# Patient Record
Sex: FEMALE | Race: BLACK OR AFRICAN AMERICAN | NOT HISPANIC OR LATINO | Employment: FULL TIME | URBAN - METROPOLITAN AREA
[De-identification: names, ages, dates, MRNs, and addresses within clinical notes are randomized per-mention and may not be internally consistent; named-entity substitution may affect disease eponyms.]

---

## 2019-11-07 ENCOUNTER — OCCUPATIONAL HEALTH (OUTPATIENT)
Dept: URGENT CARE | Facility: CLINIC | Age: 44
End: 2019-11-07

## 2019-11-07 DIAGNOSIS — Z02.83 ENCOUNTER FOR DRUG SCREENING: Primary | ICD-10-CM

## 2019-11-07 PROCEDURE — 80306 OOH DOT DRUG SCREEN: ICD-10-PCS | Mod: S$GLB,,, | Performed by: PREVENTIVE MEDICINE

## 2019-11-07 PROCEDURE — 80306 DRUG TEST PRSMV INSTRMNT: CPT | Mod: S$GLB,,, | Performed by: PREVENTIVE MEDICINE

## 2020-04-13 ENCOUNTER — HOSPITAL ENCOUNTER (EMERGENCY)
Facility: HOSPITAL | Age: 45
Discharge: HOME | End: 2020-04-13
Attending: EMERGENCY MEDICINE
Payer: COMMERCIAL

## 2020-04-13 VITALS
WEIGHT: 140 LBS | HEART RATE: 72 BPM | TEMPERATURE: 99 F | SYSTOLIC BLOOD PRESSURE: 127 MMHG | RESPIRATION RATE: 20 BRPM | OXYGEN SATURATION: 98 % | DIASTOLIC BLOOD PRESSURE: 75 MMHG

## 2020-04-13 DIAGNOSIS — O20.0 THREATENED MISCARRIAGE: Primary | ICD-10-CM

## 2020-04-13 DIAGNOSIS — N30.01 ACUTE CYSTITIS WITH HEMATURIA: ICD-10-CM

## 2020-04-13 LAB
ABO + RH BLD: NORMAL
ALBUMIN SERPL BCP-MCNC: 3.8 G/DL (ref 3.5–5.2)
ALP SERPL-CCNC: 32 U/L (ref 55–135)
ALT SERPL W/O P-5'-P-CCNC: 10 U/L (ref 10–44)
ANION GAP SERPL CALC-SCNC: 10 MMOL/L (ref 8–16)
AST SERPL-CCNC: 17 U/L (ref 10–40)
B-HCG UR QL: POSITIVE
BACTERIA #/AREA URNS HPF: ABNORMAL /HPF
BASOPHILS # BLD AUTO: 0.05 K/UL (ref 0–0.2)
BASOPHILS NFR BLD: 0.7 % (ref 0–1.9)
BILIRUB SERPL-MCNC: 0.3 MG/DL (ref 0.1–1)
BILIRUB UR QL STRIP: NEGATIVE
BUN SERPL-MCNC: 11 MG/DL (ref 6–20)
CALCIUM SERPL-MCNC: 8.7 MG/DL (ref 8.7–10.5)
CHLORIDE SERPL-SCNC: 106 MMOL/L (ref 95–110)
CLARITY UR: CLEAR
CO2 SERPL-SCNC: 22 MMOL/L (ref 23–29)
COLOR UR: ABNORMAL
CREAT SERPL-MCNC: 0.7 MG/DL (ref 0.5–1.4)
CTP QC/QA: YES
DIFFERENTIAL METHOD: ABNORMAL
EOSINOPHIL # BLD AUTO: 0.2 K/UL (ref 0–0.5)
EOSINOPHIL NFR BLD: 2.3 % (ref 0–8)
ERYTHROCYTE [DISTWIDTH] IN BLOOD BY AUTOMATED COUNT: 12.4 % (ref 11.5–14.5)
EST. GFR  (AFRICAN AMERICAN): >60 ML/MIN/1.73 M^2
EST. GFR  (NON AFRICAN AMERICAN): >60 ML/MIN/1.73 M^2
GLUCOSE SERPL-MCNC: 87 MG/DL (ref 70–110)
GLUCOSE UR QL STRIP: NEGATIVE
HCG INTACT+B SERPL-ACNC: 4673 MIU/ML
HCT VFR BLD AUTO: 32.6 % (ref 37–48.5)
HGB BLD-MCNC: 10.6 G/DL (ref 12–16)
HGB UR QL STRIP: ABNORMAL
HYALINE CASTS #/AREA URNS LPF: 0 /LPF
IMM GRANULOCYTES # BLD AUTO: 0.04 K/UL (ref 0–0.04)
IMM GRANULOCYTES NFR BLD AUTO: 0.6 % (ref 0–0.5)
KETONES UR QL STRIP: ABNORMAL
LEUKOCYTE ESTERASE UR QL STRIP: ABNORMAL
LYMPHOCYTES # BLD AUTO: 1.7 K/UL (ref 1–4.8)
LYMPHOCYTES NFR BLD: 24.6 % (ref 18–48)
MCH RBC QN AUTO: 27.4 PG (ref 27–31)
MCHC RBC AUTO-ENTMCNC: 32.5 G/DL (ref 32–36)
MCV RBC AUTO: 84 FL (ref 82–98)
MICROSCOPIC COMMENT: ABNORMAL
MONOCYTES # BLD AUTO: 0.6 K/UL (ref 0.3–1)
MONOCYTES NFR BLD: 8.7 % (ref 4–15)
NEUTROPHILS # BLD AUTO: 4.3 K/UL (ref 1.8–7.7)
NEUTROPHILS NFR BLD: 63.1 % (ref 38–73)
NITRITE UR QL STRIP: POSITIVE
NRBC BLD-RTO: 0 /100 WBC
PH UR STRIP: 7 [PH] (ref 5–8)
PLATELET # BLD AUTO: 284 K/UL (ref 150–350)
PMV BLD AUTO: 10 FL (ref 9.2–12.9)
POTASSIUM SERPL-SCNC: 3.7 MMOL/L (ref 3.5–5.1)
PROT SERPL-MCNC: 6.9 G/DL (ref 6–8.4)
PROT UR QL STRIP: ABNORMAL
RBC # BLD AUTO: 3.87 M/UL (ref 4–5.4)
RBC #/AREA URNS HPF: >100 /HPF (ref 0–4)
SODIUM SERPL-SCNC: 138 MMOL/L (ref 136–145)
SP GR UR STRIP: 1.01 (ref 1–1.03)
URN SPEC COLLECT METH UR: ABNORMAL
UROBILINOGEN UR STRIP-ACNC: 1 EU/DL
WBC # BLD AUTO: 6.87 K/UL (ref 3.9–12.7)
WBC #/AREA URNS HPF: 11 /HPF (ref 0–5)

## 2020-04-13 PROCEDURE — 25000003 PHARM REV CODE 250: Performed by: PHYSICIAN ASSISTANT

## 2020-04-13 PROCEDURE — 81000 URINALYSIS NONAUTO W/SCOPE: CPT

## 2020-04-13 PROCEDURE — 86901 BLOOD TYPING SEROLOGIC RH(D): CPT

## 2020-04-13 PROCEDURE — 88305 TISSUE EXAM BY PATHOLOGIST: ICD-10-PCS | Mod: 26,,, | Performed by: PATHOLOGY

## 2020-04-13 PROCEDURE — 99284 EMERGENCY DEPT VISIT MOD MDM: CPT | Mod: 25

## 2020-04-13 PROCEDURE — 85025 COMPLETE CBC W/AUTO DIFF WBC: CPT

## 2020-04-13 PROCEDURE — 88305 TISSUE EXAM BY PATHOLOGIST: CPT | Performed by: PATHOLOGY

## 2020-04-13 PROCEDURE — 96360 HYDRATION IV INFUSION INIT: CPT

## 2020-04-13 PROCEDURE — 88305 TISSUE EXAM BY PATHOLOGIST: CPT | Mod: 26,,, | Performed by: PATHOLOGY

## 2020-04-13 PROCEDURE — 80053 COMPREHEN METABOLIC PANEL: CPT

## 2020-04-13 PROCEDURE — 84702 CHORIONIC GONADOTROPIN TEST: CPT

## 2020-04-13 PROCEDURE — 96361 HYDRATE IV INFUSION ADD-ON: CPT

## 2020-04-13 PROCEDURE — 81025 URINE PREGNANCY TEST: CPT | Performed by: PHYSICIAN ASSISTANT

## 2020-04-13 PROCEDURE — 87086 URINE CULTURE/COLONY COUNT: CPT

## 2020-04-13 RX ORDER — CEPHALEXIN 500 MG/1
500 CAPSULE ORAL EVERY 12 HOURS
Qty: 14 CAPSULE | Refills: 0 | Status: SHIPPED | OUTPATIENT
Start: 2020-04-13 | End: 2020-04-20

## 2020-04-13 RX ADMIN — SODIUM CHLORIDE 1000 ML: 0.9 INJECTION, SOLUTION INTRAVENOUS at 03:04

## 2020-04-13 NOTE — ED NOTES
PRESENT TO ED DUE TO 10 WEEKS PREGNANT AND WAS SPOTTING FOR 2 DAYS, THEN TODAY STARTED HEAVY BLEEDING WITH CLOTTING. PAIN TO RLQ.  APPEARANCE: Alert, oriented and in no acute distress.  CARDIAC: Normal rate and rhythm, no murmur heard.   PERIPHERAL VASCULAR: peripheral pulses present. Normal cap refill. No edema. Warm to touch.    RESPIRATORY:Normal rate and effort, breath sounds clear bilaterally throughout chest. Respirations are equal and unlabored no obvious signs of distress.  GASTRO: soft, bowel sounds normal,tenderness, no abdominal distention.  MUSC: Full ROM. No bony tenderness or soft tissue tenderness. No obvious deformity.  SKIN: Skin is warm and dry, normal skin turgor, mucous membranes moist.  NEURO: 5/5 strength major flexors/extensors bilaterally. Sensory intact to light touch bilaterally. Wilder coma scale: eyes open spontaneously-4, oriented & converses-5, obeys commands-6. No neurological abnormalities.   MENTAL STATUS: awake, alert and aware of environment.  EYE: PERRL, both eyes: pupils brisk and reactive to light. Normal size.  ENT: EARS: no obvious drainage. NOSE: no active bleeding.

## 2020-04-13 NOTE — ED PROVIDER NOTES
Encounter Date: 2020    SCRIBE #1 NOTE: I, Francesco Pugh, alexa scribing for, and in the presence of, ADAN Rose.       History     Chief Complaint   Patient presents with    Vaginal Bleeding     pt states vaginal bleeding, with clots that began this am, pt is 10 weeks pregnant, G3      Herminia Melo, a 44 y.o. female   has no past medical history on file.     She presents to the ED evaluation of vaginal bleeding that started to days ago as spotting and has increased in flow with passage of clots.  Patient states that she has not been seen for this pregnancy and has pending appointment with her OB/GYN.  Denies fevers, vomiting, nausea.        The history is provided by the patient.     Review of patient's allergies indicates:  No Known Allergies  No past medical history on file.  No past surgical history on file.  No family history on file.  Social History     Tobacco Use    Smoking status: Not on file   Substance Use Topics    Alcohol use: Not on file    Drug use: Not on file     Review of Systems   Constitutional: Negative for fever.   Gastrointestinal: Negative for abdominal pain, nausea and vomiting.   Genitourinary: Positive for vaginal bleeding. Negative for vaginal discharge and vaginal pain.   All other systems reviewed and are negative.      Physical Exam     Initial Vitals   BP Pulse Resp Temp SpO2   20 1542 20 1541 20 1541 20 1541 20 1541   (!) 150/64 80 17 98.3 °F (36.8 °C) 99 %      MAP       --                Physical Exam    Nursing note and vitals reviewed.  Constitutional: She appears well-developed and well-nourished. She is not diaphoretic. No distress.   HENT:   Head: Normocephalic and atraumatic.   Right Ear: External ear normal.   Left Ear: External ear normal.   Nose: Nose normal.   Eyes: Conjunctivae and EOM are normal.   Neck: Normal range of motion.   Cardiovascular: Normal rate and regular rhythm.   Pulmonary/Chest: Breath sounds  normal. No respiratory distress. She has no wheezes. She has no rhonchi. She has no rales. She exhibits no tenderness.   Abdominal: Soft. Bowel sounds are normal. She exhibits no distension. There is tenderness in the right lower quadrant and suprapubic area. There is no rebound and no guarding.   Genitourinary: Pelvic exam was performed with patient supine. There is bleeding in the vagina.   Genitourinary Comments: Clots noted in vault which were removed in states and specimen cup.  Os open.  Chaperoned by Alexandria Terrell RN    Musculoskeletal: Normal range of motion.   Neurological: She is alert and oriented to person, place, and time.   Skin: Skin is warm and dry. Capillary refill takes less than 2 seconds. No rash noted. No erythema.   Psychiatric: She has a normal mood and affect. Thought content normal.         ED Course   Procedures  Labs Reviewed   CBC W/ AUTO DIFFERENTIAL - Abnormal; Notable for the following components:       Result Value    RBC 3.87 (*)     Hemoglobin 10.6 (*)     Hematocrit 32.6 (*)     Immature Granulocytes 0.6 (*)     All other components within normal limits   COMPREHENSIVE METABOLIC PANEL - Abnormal; Notable for the following components:    CO2 22 (*)     Alkaline Phosphatase 32 (*)     All other components within normal limits   URINALYSIS, REFLEX TO URINE CULTURE - Abnormal; Notable for the following components:    Color, UA Red (*)     Protein, UA 3+ (*)     Ketones, UA 1+ (*)     Occult Blood UA 3+ (*)     Nitrite, UA Positive (*)     Leukocytes, UA 1+ (*)     All other components within normal limits    Narrative:     Preferred Collection Type->Urine, Clean Catch   URINALYSIS MICROSCOPIC - Abnormal; Notable for the following components:    RBC, UA >100 (*)     WBC, UA 11 (*)     Bacteria Few (*)     All other components within normal limits    Narrative:     Preferred Collection Type->Urine, Clean Catch   POCT URINE PREGNANCY - Abnormal; Notable for the following components:     POC Preg Test, Ur Positive (*)     All other components within normal limits   CULTURE, URINE   HCG, QUANTITATIVE, PREGNANCY   GROUP & RH          Imaging Results          US OB <14 Wks TransAbd & TransVag, Single Gestation (XPD) (Final result)  Result time 20 17:14:01    Final result by Marci Aguila MD (20 17:14:01)                 Impression:      No intrauterine pregnancy or gestational sac visualized, technically pregnancy of unknown location.  Findings may relate to early pregnancy or spontaneous .  No adnexal abnormalities or significant free fluid seen to suggest ectopic pregnancy.  Recommend serial beta hCGs and repeat pelvic ultrasound as clinically warranted.      Electronically signed by: Marci Aguila MD  Date:    2020  Time:    17:14             Narrative:    EXAMINATION:  US OB <14 WEEKS, TRANSABDOM & TRANSVAG, SINGLE GESTATION (XPD)    CLINICAL HISTORY:  Vag Bleeding;    TECHNIQUE:  Transabdominal sonography of the pelvis was performed, followed by transvaginal sonography to better evaluate the uterus and ovaries.    COMPARISON:  None.    FINDINGS:  The uterus is retroverted and measures 8.0 x 5.0 x 5.3 cm.  Uterine parenchyma is heterogenous in echotexture.  No intrauterine pregnancy or gestational sac seen.  Endometrium is mildly thickened and hypervascular in appearance.  Nabothian cysts are noted.    The right ovary measures 3.1 x 2.0 x 2.0 cm. The left ovary measures 3.2 x 1.6 x 1.4 cm. Arterial and venous flow are preserved bilaterally. A suspected corpus luteum cyst measuring 1.8 x 1.5 x 1.8 cm is seen in the right ovary.  No adnexal abnormalities are seen.  Small volume physiologic free fluid is seen.                                 Medical Decision Making:   Initial Assessment:   Vaginal bleeding in pregnancy  Differential Diagnosis:   Miscarriage, subchorionic hemorrhage, UTI  Clinical Tests:   Lab Tests: Ordered and Reviewed  The following lab test(s) were  unremarkable: CBC, CMP, Urinalysis and B-HCG  ED Management:  Patient presents to the ER for evaluation vaginal bleeding in early pregnancy.  Mild tenderness to palpation in right lower quadrant as well as suprapubic region.  UA concerning for mild dehydration as well as UTI.  No other concerning findings on lab work.  Ultrasound shows no evidence of IUP.  No free fluid indicated scopic.  Patient was given expectant management for probable miscarriage and was instructed to follow up with her OB for cereal beta HCG testing and further instruction.  Instructed on pelvic rest as well.  Patient was given explicit reasons for return she verbalized understanding and agreement with plan.                                 Clinical Impression:       ICD-10-CM ICD-9-CM   1. Threatened miscarriage O20.0 640.00   2. Acute cystitis with hematuria N30.01 595.0                   I, Ling Avina PA-C, personally performed the services described in this documentation. All medical record entries made by the scribe were at my direction and in my presence.  I have reviewed the chart and agree that the record reflects my personal performance and is accurate and complete.                 Ling Avina PA-C  04/13/20 1825

## 2020-04-13 NOTE — ED NOTES
Pt presents to the ED from home with c/o vaginal bleeding in pregnancy confirmed with home pregnancy test. Pt reports LMP in February, has not seen OB. Pt is , reports spotting that started 2 days ago with increased bleeding and large blood clots today. Pt also c/o lower abd/pelvic cramping. Pt denies any dizziness, sob, cp. No other complaints presented at this time.

## 2020-04-14 ENCOUNTER — TELEPHONE (OUTPATIENT)
Dept: OBSTETRICS AND GYNECOLOGY | Facility: CLINIC | Age: 45
End: 2020-04-14

## 2020-04-14 ENCOUNTER — PES CALL (OUTPATIENT)
Dept: ADMINISTRATIVE | Facility: CLINIC | Age: 45
End: 2020-04-14

## 2020-04-14 DIAGNOSIS — O03.4 INCOMPLETE ABORTION: Primary | ICD-10-CM

## 2020-04-14 NOTE — TELEPHONE ENCOUNTER
Patient states she was having heavy bleeding (saturating an over night pad every hour) with a lot of large blood clots and pain.  Patient denies having heavy bleeding currently and pain comes and goes.  Patient advised on ectopic and bleeding precautions and also DVT precautions.  Repeat CG scheduled tomorrow at 4:30 and patient will come in to see Dr. Valdivia on Friday @ 8:30

## 2020-04-14 NOTE — TELEPHONE ENCOUNTER
----- Message from Analilia Redman sent at 4/14/2020  8:39 AM CDT -----  Contact: [phone]/self  Patient is requesting a call back. She went to the ER last night and had a miscarriage. She needs a follow up appt. Please call her. Thanks

## 2020-04-14 NOTE — TELEPHONE ENCOUNTER
Notes reviewed but please verify that patient did have heavy bleeding. Also, did the patient ever have an US prior to ED at another provider to verify pregnancy? Send for BHCG late Wednesday, give ectopic and bleeding precautions and will see her on Friday. If gets leg calf pain and SOB return to ED

## 2020-04-14 NOTE — TELEPHONE ENCOUNTER
Patient was scheduled to see Dr. Valdivia on 5/20/20 to confirm her pregnancy.  Patient went to the ED yesterday afternoon for vaginal bleeding and pain.  Patient's HCG was 4673.  Ultrasound did not identify an IUP.  Patient states her LMP was 2/2/20, she would be 10w2d by LMP.  Patient is currently complaining of bilateral lower quadrant and low back pain 4/10 but denies menstrual like cramping.  Patient is also complaining of numbness in her right leg that started this morning but denies SOB.  ED is recommending follow up with OB for serial BHCG's.  Notified Dr. Valdivia is out of the office today but will forward the message for further recommendations and if/when needs to be seen.  Patient verbalized understanding.    Please advise.

## 2020-04-15 LAB
BACTERIA UR CULT: NO GROWTH
FINAL PATHOLOGIC DIAGNOSIS: NORMAL
GROSS: NORMAL

## 2020-04-16 ENCOUNTER — LAB VISIT (OUTPATIENT)
Dept: LAB | Facility: HOSPITAL | Age: 45
End: 2020-04-16
Attending: OBSTETRICS & GYNECOLOGY
Payer: COMMERCIAL

## 2020-04-16 DIAGNOSIS — O03.4 INCOMPLETE ABORTION: ICD-10-CM

## 2020-04-16 LAB — HCG INTACT+B SERPL-ACNC: 758 MIU/ML

## 2020-04-16 PROCEDURE — 84702 CHORIONIC GONADOTROPIN TEST: CPT

## 2020-04-16 PROCEDURE — 36415 COLL VENOUS BLD VENIPUNCTURE: CPT

## 2020-04-17 ENCOUNTER — OFFICE VISIT (OUTPATIENT)
Dept: OBSTETRICS AND GYNECOLOGY | Facility: CLINIC | Age: 45
End: 2020-04-17
Payer: COMMERCIAL

## 2020-04-17 VITALS
WEIGHT: 146.69 LBS | SYSTOLIC BLOOD PRESSURE: 100 MMHG | HEIGHT: 65 IN | DIASTOLIC BLOOD PRESSURE: 60 MMHG | BODY MASS INDEX: 24.44 KG/M2

## 2020-04-17 DIAGNOSIS — O03.4 INCOMPLETE ABORTION: Primary | ICD-10-CM

## 2020-04-17 PROCEDURE — 99203 PR OFFICE/OUTPT VISIT, NEW, LEVL III, 30-44 MIN: ICD-10-PCS | Mod: S$GLB,,, | Performed by: OBSTETRICS & GYNECOLOGY

## 2020-04-17 PROCEDURE — 99999 PR PBB SHADOW E&M-EST. PATIENT-LVL III: ICD-10-PCS | Mod: PBBFAC,,, | Performed by: OBSTETRICS & GYNECOLOGY

## 2020-04-17 PROCEDURE — 99999 PR PBB SHADOW E&M-EST. PATIENT-LVL III: CPT | Mod: PBBFAC,,, | Performed by: OBSTETRICS & GYNECOLOGY

## 2020-04-17 PROCEDURE — 3008F PR BODY MASS INDEX (BMI) DOCUMENTED: ICD-10-PCS | Mod: CPTII,S$GLB,, | Performed by: OBSTETRICS & GYNECOLOGY

## 2020-04-17 PROCEDURE — 3008F BODY MASS INDEX DOCD: CPT | Mod: CPTII,S$GLB,, | Performed by: OBSTETRICS & GYNECOLOGY

## 2020-04-17 PROCEDURE — 99203 OFFICE O/P NEW LOW 30 MIN: CPT | Mod: S$GLB,,, | Performed by: OBSTETRICS & GYNECOLOGY

## 2020-04-17 RX ORDER — MISOPROSTOL 200 UG/1
400 TABLET ORAL EVERY 6 HOURS
Qty: 56 TABLET | Refills: 1 | Status: SHIPPED | OUTPATIENT
Start: 2020-04-17 | End: 2020-09-28

## 2020-04-17 RX ORDER — METRONIDAZOLE 500 MG/1
500 TABLET ORAL EVERY 12 HOURS
Qty: 14 TABLET | Refills: 0 | Status: SHIPPED | OUTPATIENT
Start: 2020-04-17 | End: 2020-04-24

## 2020-04-17 NOTE — PROGRESS NOTES
"OBSTETRIC HISTORY:   OB History        3    Para   2    Term   2            AB        Living   2       SAB        TAB        Ectopic        Multiple        Live Births   2                  COMPREHENSIVE GYN HISTORY:  PAP History: Denies abnormal Paps.  Infection History: Chlamydia. Denies PID.  Benign History: Denies uterine fibroids. Denies ovarian cysts. Denies endometriosis.   Cancer History: Denies cervical cancer. Denies uterine cancer or hyperplasia. Denies ovarian cancer. Denies vulvar cancer or pre-cancer. Denies vaginal cancer or pre-cancer. Denies breast cancer. Denies colon cancer.  Sexual Activity History:   reports that she currently engages in sexual activity and has had partner(s) who are Male.   Menstrual History: Every 30 days, flows for 2 days. Light flow.  Dysmenorrhea History: Reports no dysmenorrhea.  Contraception History: None    HPI:   44 y.o.  Patient's last menstrual period was 2020.   Patient is  here for follow up of ER visit with suspected miscarriage. Her BHCG dropped appropriately. She has left sided pain and low back pain. Diagnosed with UTI at ED. She was trying to get pregnant.  She denies bladder, breast and bowel complaints.    ROS:  GENERAL: Denies weight gain or weight loss. Feeling well overall.   SKIN: Denies rash or lesions.   HEAD: Denies headache.   NODES: Denies enlarged lymph nodes.   CHEST: Denies shortness of breath.   ABDOMEN: No abdominal pain, constipation, diarrhea, nausea, vomiting or rectal bleeding.   URINARY: No frequency, dysuria, hematuria, or burning on urination.  REPRODUCTIVE: See HPI.   BREASTS: The patient denies pain, lumps, or nipple discharge.   HEMATOLOGIC: No easy bruisability.   MUSCULOSKELETAL: Denies joint pain or back pain.   NEUROLOGIC: Denies weakness.   PSYCHIATRIC: Denies depression, anxiety or mood swings.    PE:   /60 (BP Location: Right arm, Patient Position: Sitting)   Ht 5' 5" (1.651 m)   Wt 66.5 kg (146 lb " 11.2 oz)   LMP 2020   BMI 24.41 kg/m²   APPEARANCE: Well nourished, well developed, in no acute distress.  ABDOMEN: Soft. No tenderness or masses. No hernias.  PELVIC:   VULVA: No lesions. Normal female genitalia.  URETHRAL MEATUS: Normal size and location, no lesions, no prolapse.  URETHRA: No masses, tenderness, prolapse or scarring.  VAGINA: Moist and well rugated, bloody discharge, no significant cystocele or rectocele.  CERVIX: No lesions and discharge.  UTERUS: Normal size, regular shape, mobile, tender, bladder base nontender.  ADNEXA: No masses but lower pelvic tenderness.    ASSESSMENT:  Incomplete --discussed options of expectant management vs Cytotec vs D&C--wants Cytotec  BHCG   Out of work until   Bleeding/fever precautions  Flagyl to cover for BV    PLAN:  RTO for lab  When she gets pregnant again will send for labs  No sex until negative BHCG

## 2020-04-24 ENCOUNTER — TELEPHONE (OUTPATIENT)
Dept: OBSTETRICS AND GYNECOLOGY | Facility: CLINIC | Age: 45
End: 2020-04-24

## 2020-04-24 ENCOUNTER — LAB VISIT (OUTPATIENT)
Dept: LAB | Facility: HOSPITAL | Age: 45
End: 2020-04-24
Attending: OBSTETRICS & GYNECOLOGY
Payer: COMMERCIAL

## 2020-04-24 DIAGNOSIS — O03.4 INCOMPLETE ABORTION: Primary | ICD-10-CM

## 2020-04-24 DIAGNOSIS — O03.4 INCOMPLETE ABORTION: ICD-10-CM

## 2020-04-24 LAB — HCG INTACT+B SERPL-ACNC: 77 MIU/ML

## 2020-04-24 PROCEDURE — 36415 COLL VENOUS BLD VENIPUNCTURE: CPT

## 2020-04-24 PROCEDURE — 84702 CHORIONIC GONADOTROPIN TEST: CPT

## 2020-04-24 NOTE — TELEPHONE ENCOUNTER
----- Message from Hyun Maurice sent at 4/24/2020  8:17 AM CDT -----  Contact: Herminia Hope called requesting a call back in regards to her previous appt today and the upcoming appt. Please call [phone] to discuss.

## 2020-04-24 NOTE — TELEPHONE ENCOUNTER
Patient states she did her lab work this morning, but had an appointment scheduled for may 20 th should she still keep that appointment.

## 2020-04-24 NOTE — TELEPHONE ENCOUNTER
Notify patient that she just needs a repeat hormone of pregnancy in 2 weeks   This morning it is still positive at 77 and needs to be negative  Orders signed please schedule her for lab in approx 2 weeks

## 2020-06-04 ENCOUNTER — LAB VISIT (OUTPATIENT)
Dept: LAB | Facility: HOSPITAL | Age: 45
End: 2020-06-04
Attending: OBSTETRICS & GYNECOLOGY
Payer: COMMERCIAL

## 2020-06-04 ENCOUNTER — TELEPHONE (OUTPATIENT)
Dept: OBSTETRICS AND GYNECOLOGY | Facility: CLINIC | Age: 45
End: 2020-06-04

## 2020-06-04 DIAGNOSIS — O03.4 INCOMPLETE ABORTION: ICD-10-CM

## 2020-06-04 LAB — HCG INTACT+B SERPL-ACNC: <1.2 MIU/ML

## 2020-06-04 PROCEDURE — 36415 COLL VENOUS BLD VENIPUNCTURE: CPT

## 2020-06-04 PROCEDURE — 84702 CHORIONIC GONADOTROPIN TEST: CPT

## 2020-06-04 NOTE — TELEPHONE ENCOUNTER
Notify [facility] is negative. Did she decide about wanting any birth control??

## 2020-06-05 NOTE — TELEPHONE ENCOUNTER
Attempted to contact patient.  No answer and her mailbox is full.  Patient does not have patient portal

## 2020-09-28 ENCOUNTER — ROUTINE PRENATAL (OUTPATIENT)
Dept: OBSTETRICS AND GYNECOLOGY | Facility: CLINIC | Age: 45
End: 2020-09-28
Payer: COMMERCIAL

## 2020-09-28 VITALS
SYSTOLIC BLOOD PRESSURE: 110 MMHG | HEIGHT: 65 IN | WEIGHT: 149.38 LBS | DIASTOLIC BLOOD PRESSURE: 60 MMHG | BODY MASS INDEX: 24.89 KG/M2

## 2020-09-28 DIAGNOSIS — Z01.419 WELL WOMAN EXAM WITH ROUTINE GYNECOLOGICAL EXAM: Primary | ICD-10-CM

## 2020-09-28 DIAGNOSIS — N93.9 ABNORMAL UTERINE BLEEDING (AUB): ICD-10-CM

## 2020-09-28 DIAGNOSIS — Z12.4 ROUTINE CERVICAL SMEAR: ICD-10-CM

## 2020-09-28 DIAGNOSIS — Z12.31 ENCOUNTER FOR SCREENING MAMMOGRAM FOR MALIGNANT NEOPLASM OF BREAST: ICD-10-CM

## 2020-09-28 LAB
B-HCG UR QL: NEGATIVE
CTP QC/QA: YES

## 2020-09-28 PROCEDURE — 99396 PR PREVENTIVE VISIT,EST,40-64: ICD-10-PCS | Mod: S$GLB,,, | Performed by: OBSTETRICS & GYNECOLOGY

## 2020-09-28 PROCEDURE — 3008F BODY MASS INDEX DOCD: CPT | Mod: CPTII,S$GLB,, | Performed by: OBSTETRICS & GYNECOLOGY

## 2020-09-28 PROCEDURE — 99999 PR PBB SHADOW E&M-EST. PATIENT-LVL III: CPT | Mod: PBBFAC,,, | Performed by: OBSTETRICS & GYNECOLOGY

## 2020-09-28 PROCEDURE — 87624 HPV HI-RISK TYP POOLED RSLT: CPT

## 2020-09-28 PROCEDURE — 88175 CYTOPATH C/V AUTO FLUID REDO: CPT

## 2020-09-28 PROCEDURE — 99999 PR PBB SHADOW E&M-EST. PATIENT-LVL III: ICD-10-PCS | Mod: PBBFAC,,, | Performed by: OBSTETRICS & GYNECOLOGY

## 2020-09-28 PROCEDURE — 3008F PR BODY MASS INDEX (BMI) DOCUMENTED: ICD-10-PCS | Mod: CPTII,S$GLB,, | Performed by: OBSTETRICS & GYNECOLOGY

## 2020-09-28 PROCEDURE — 99396 PREV VISIT EST AGE 40-64: CPT | Mod: S$GLB,,, | Performed by: OBSTETRICS & GYNECOLOGY

## 2020-09-28 NOTE — PROGRESS NOTES
"OBSTETRIC HISTORY:   OB History        3    Para   2    Term   2            AB        Living   2       SAB        TAB        Ectopic        Multiple        Live Births   2                COMPREHENSIVE GYN HISTORY:  PAP History: Denies abnormal Paps.  Infection History: Chlamydia. Denies PID.  Benign History: Denies uterine fibroids. Denies ovarian cysts. Denies endometriosis.   Cancer History: Denies cervical cancer. Denies uterine cancer or hyperplasia. Denies ovarian cancer. Denies vulvar cancer or pre-cancer. Denies vaginal cancer or pre-cancer. Denies breast cancer. Denies colon cancer.  Sexual Activity History:   reports that she currently engages in sexual activity and has had partner(s) who are Male.   Menstrual History: Every 30 days, flows for 2 days. Light flow.  Dysmenorrhea History: Reports no dysmenorrhea.  Contraception History: None         HPI:   45 y.o.  Patient's last menstrual period was 2020.   Patient is  here for her annual gynecologic exam.  She had pariod 20 spotting then 9/3/20 had +UPT then negative UPT. Today negative UPT. She wants to get pregnant.  She denies bladder, breast and bowel complaints.    ROS:  GENERAL: Denies weight gain or weight loss. Feeling well overall.   SKIN: Denies rash or lesions.   HEAD: Denies headache.   NODES: Denies enlarged lymph nodes.   CHEST: Denies shortness of breath.   ABDOMEN: No abdominal pain, constipation, diarrhea, nausea, vomiting or rectal bleeding.   URINARY: No frequency, dysuria, hematuria, or burning on urination.  REPRODUCTIVE: See HPI.   BREASTS: The patient denies pain, lumps, or nipple discharge.   HEMATOLOGIC: No easy bruisability.   MUSCULOSKELETAL: Denies joint pain or back pain.   NEUROLOGIC: Denies weakness.   PSYCHIATRIC: Denies depression, anxiety or mood swings.    PE:   /60   Ht 5' 5" (1.651 m)   Wt 67.8 kg (149 lb 5.8 oz)   LMP 2020   Breastfeeding Unknown Comment: urine leuko large Blood  " BMI 24.86 kg/m²   APPEARANCE: Well nourished, well developed, in no acute distress.  NECK: Neck symmetric without  thyromegaly.  NODES: No inguinal, cervical lymph node enlargement.  CHEST: Lungs clear to auscultation.  HEART: Regular rate and rhythm, no murmurs, rubs or gallops.  ABDOMEN: Soft. No tenderness or masses. No hernias.  BREASTS: Symmetrical, no skin changes or visible lesions. No palpable masses, nipple discharge or adenopathy bilaterally.  PELVIC:   VULVA: No lesions. Normal female genitalia.  URETHRAL MEATUS: Normal size and location, no lesions, no prolapse.  URETHRA: No masses, tenderness, prolapse or scarring.  VAGINA: Moist and well rugated, no discharge, no significant cystocele or rectocele.  CERVIX: No lesions and discharge.  UTERUS: Normal size, regular shape, mobile, non-tender, bladder base nontender.  ADNEXA: No masses or tenderness.    PROCEDURES:  Pap smear  HRHPV    Assessment:  Normal Gynecologic Exam  AUB--get MMG then RTO to discuss CCCT    Plan:  Mammogram and Colonoscopy if indicated by current recommendations.  Return to clinic in one year or for any problems or complaints.    Counseling:  Patient was counseled today on A.C.S. Pap guidelines and recommendations for yearly pelvic exams and monthly self breast exams; to see her PCP for other health maintenance. Regular exercise and healthy diet.

## 2020-10-01 ENCOUNTER — PATIENT MESSAGE (OUTPATIENT)
Dept: OBSTETRICS AND GYNECOLOGY | Facility: CLINIC | Age: 45
End: 2020-10-01

## 2020-10-01 LAB
HPV HR 12 DNA SPEC QL NAA+PROBE: POSITIVE
HPV16 AG SPEC QL: NEGATIVE
HPV18 DNA SPEC QL NAA+PROBE: NEGATIVE

## 2020-10-08 ENCOUNTER — TELEPHONE (OUTPATIENT)
Dept: OBSTETRICS AND GYNECOLOGY | Facility: CLINIC | Age: 45
End: 2020-10-08

## 2020-10-08 NOTE — TELEPHONE ENCOUNTER
----- Message from Arline Cardenas sent at 10/8/2020  9:34 AM CDT -----  Contact: SELF [phone]  Patient would like to speak with you about her last test result. Please advise

## 2020-10-12 ENCOUNTER — TELEPHONE (OUTPATIENT)
Dept: OBSTETRICS AND GYNECOLOGY | Facility: CLINIC | Age: 45
End: 2020-10-12

## 2020-10-12 DIAGNOSIS — Z87.59 HISTORY OF POOR PREGNANCY OUTCOME: Primary | ICD-10-CM

## 2020-10-12 NOTE — TELEPHONE ENCOUNTER
----- Message from Analilia Redman sent at 10/12/2020 11:13 AM CDT -----  Regarding: [facility] is reporting the patient has a positive EPT test and declined having a Mammogram. She was concerned about the test and will contact the office to confirm her pregnancy.  Thanks

## 2020-10-12 NOTE — TELEPHONE ENCOUNTER
Patient called and states she took a pregnancy test at home and it was positive. She was in to see us on 09/28/2020 and her pregnancy test was neg .    Please advise me.

## 2020-10-13 ENCOUNTER — LAB VISIT (OUTPATIENT)
Dept: LAB | Facility: HOSPITAL | Age: 45
End: 2020-10-13
Attending: OBSTETRICS & GYNECOLOGY
Payer: COMMERCIAL

## 2020-10-13 DIAGNOSIS — Z87.59 HISTORY OF POOR PREGNANCY OUTCOME: ICD-10-CM

## 2020-10-13 LAB — HCG INTACT+B SERPL-ACNC: NORMAL MIU/ML

## 2020-10-13 PROCEDURE — 84702 CHORIONIC GONADOTROPIN TEST: CPT

## 2020-10-13 PROCEDURE — 36415 COLL VENOUS BLD VENIPUNCTURE: CPT

## 2020-10-13 PROCEDURE — 84144 ASSAY OF PROGESTERONE: CPT

## 2020-10-14 ENCOUNTER — TELEPHONE (OUTPATIENT)
Dept: OBSTETRICS AND GYNECOLOGY | Facility: CLINIC | Age: 45
End: 2020-10-14

## 2020-10-14 LAB — PROGEST SERPL-MCNC: 16.5 NG/ML

## 2020-10-14 RX ORDER — PROGESTERONE 200 MG/1
400 CAPSULE ORAL NIGHTLY
Qty: 60 CAPSULE | Refills: 1 | Status: SHIPPED | OUTPATIENT
Start: 2020-10-14 | End: 2020-11-12

## 2020-10-14 NOTE — TELEPHONE ENCOUNTER
----- Message from Shawn Montoya sent at 10/14/2020 12:25 PM CDT -----  Type:  Needs Medical Advice    Who Called: Herminia  Symptoms (please be specific): pt is requesting a call back from Dr. Valdivia regarding her test results from yesterday   How long has patient had these symptoms:  unknown  Pharmacy name and phone #:  n/a  Would the patient rather a call back or a response via [facility]? Call back  Best Call Back Number: [phone]  Additional Information: none

## 2020-10-14 NOTE — TELEPHONE ENCOUNTER
Notify progesterone is borderline at 16 does she want to do Prometrium? BHCG is about 10,000 but will know more once I get the repeat lab value.

## 2020-10-15 ENCOUNTER — PATIENT MESSAGE (OUTPATIENT)
Dept: OBSTETRICS AND GYNECOLOGY | Facility: CLINIC | Age: 45
End: 2020-10-15

## 2020-10-15 ENCOUNTER — TELEPHONE (OUTPATIENT)
Dept: OBSTETRICS AND GYNECOLOGY | Facility: CLINIC | Age: 45
End: 2020-10-15

## 2020-10-15 ENCOUNTER — LAB VISIT (OUTPATIENT)
Dept: LAB | Facility: HOSPITAL | Age: 45
End: 2020-10-15
Attending: OBSTETRICS & GYNECOLOGY
Payer: COMMERCIAL

## 2020-10-15 DIAGNOSIS — O20.0 THREATENED ABORTION IN EARLY PREGNANCY: Primary | ICD-10-CM

## 2020-10-15 DIAGNOSIS — Z87.59 HISTORY OF POOR PREGNANCY OUTCOME: ICD-10-CM

## 2020-10-15 LAB — HCG INTACT+B SERPL-ACNC: NORMAL MIU/ML

## 2020-10-15 PROCEDURE — 36415 COLL VENOUS BLD VENIPUNCTURE: CPT

## 2020-10-15 PROCEDURE — 84702 CHORIONIC GONADOTROPIN TEST: CPT

## 2020-10-15 NOTE — TELEPHONE ENCOUNTER
Notify [facility] went up appropriately and we need to get her in for an ultrasound (can be in [facility]) in about 2 weeks and see me in about 3-4 weeks. Order not placed yet

## 2020-10-15 NOTE — TELEPHONE ENCOUNTER
Patient notified and verbalized understanding.      Office appointment given for 11/5/20 @ 2:45.  Notified patient once I have the order we will call her back to schedule ultrasound in [facility]. Please sign pended order.

## 2020-10-20 ENCOUNTER — PATIENT MESSAGE (OUTPATIENT)
Dept: OBSTETRICS AND GYNECOLOGY | Facility: CLINIC | Age: 45
End: 2020-10-20

## 2020-10-20 LAB
FINAL PATHOLOGIC DIAGNOSIS: NORMAL
Lab: NORMAL

## 2020-10-29 ENCOUNTER — TELEPHONE (OUTPATIENT)
Dept: OBSTETRICS AND GYNECOLOGY | Facility: CLINIC | Age: 45
End: 2020-10-29

## 2020-10-29 ENCOUNTER — HOSPITAL ENCOUNTER (OUTPATIENT)
Dept: RADIOLOGY | Facility: HOSPITAL | Age: 45
Discharge: HOME | End: 2020-10-29
Attending: OBSTETRICS & GYNECOLOGY
Payer: COMMERCIAL

## 2020-10-29 DIAGNOSIS — Z87.59 HISTORY OF POOR PREGNANCY OUTCOME: Primary | ICD-10-CM

## 2020-10-29 DIAGNOSIS — O20.0 THREATENED ABORTION IN EARLY PREGNANCY: ICD-10-CM

## 2020-10-29 PROCEDURE — 76801 OB US < 14 WKS SINGLE FETUS: CPT | Mod: 26,,, | Performed by: RADIOLOGY

## 2020-10-29 PROCEDURE — 76817 TRANSVAGINAL US OBSTETRIC: CPT | Mod: 26,,, | Performed by: RADIOLOGY

## 2020-10-29 PROCEDURE — 76801 US OB <14 WEEKS, TRANSABDOM & TRANSVAG, SINGLE GESTATION (XPD): ICD-10-PCS | Mod: 26,,, | Performed by: RADIOLOGY

## 2020-10-29 PROCEDURE — 76817 US OB <14 WEEKS, TRANSABDOM & TRANSVAG, SINGLE GESTATION (XPD): ICD-10-PCS | Mod: 26,,, | Performed by: RADIOLOGY

## 2020-10-29 PROCEDURE — 76801 OB US < 14 WKS SINGLE FETUS: CPT | Mod: TC

## 2020-10-29 NOTE — TELEPHONE ENCOUNTER
[facility]  US is recommending a follow up ultrasound in 1-2 weeks secondary to heart beat of the baby that is on the lower end of normal. This may not mean anything but just needs to be followed  Will sign orders once we determine where to do US

## 2020-11-03 NOTE — TELEPHONE ENCOUNTER
Patient notified and verbalized understanding.  Next available here in the office scheduled 11/12/20 @ 2:30.    Patient is scheduled for OB visit this Thursday, 11/5.  Do you want to move appointment here in the office to 11/12 or have ultrasound repeated downstairs between now and the 5th or keep everything as it stands?  Please advise.

## 2020-11-03 NOTE — TELEPHONE ENCOUNTER
Patient notified and verbalized understanding.    Please sign pended order and route back to be linked

## 2020-11-12 ENCOUNTER — PROCEDURE VISIT (OUTPATIENT)
Dept: OBSTETRICS AND GYNECOLOGY | Facility: CLINIC | Age: 45
End: 2020-11-12
Payer: COMMERCIAL

## 2020-11-12 ENCOUNTER — ROUTINE PRENATAL (OUTPATIENT)
Dept: OBSTETRICS AND GYNECOLOGY | Facility: CLINIC | Age: 45
End: 2020-11-12
Payer: COMMERCIAL

## 2020-11-12 VITALS — BODY MASS INDEX: 25.63 KG/M2 | WEIGHT: 154 LBS | DIASTOLIC BLOOD PRESSURE: 68 MMHG | SYSTOLIC BLOOD PRESSURE: 100 MMHG

## 2020-11-12 DIAGNOSIS — Z87.59 HISTORY OF POOR PREGNANCY OUTCOME: ICD-10-CM

## 2020-11-12 DIAGNOSIS — O02.1 MISSED AB: Primary | ICD-10-CM

## 2020-11-12 DIAGNOSIS — Z34.90 EARLY STAGE OF PREGNANCY: ICD-10-CM

## 2020-11-12 PROCEDURE — 99211 OFF/OP EST MAY X REQ PHY/QHP: CPT | Mod: S$GLB,,, | Performed by: OBSTETRICS & GYNECOLOGY

## 2020-11-12 PROCEDURE — 99211 PR OFFICE/OUTPT VISIT, EST, LEVL I: ICD-10-PCS | Mod: S$GLB,,, | Performed by: OBSTETRICS & GYNECOLOGY

## 2020-11-12 PROCEDURE — 99999 PR PBB SHADOW E&M-EST. PATIENT-LVL II: ICD-10-PCS | Mod: PBBFAC,,, | Performed by: OBSTETRICS & GYNECOLOGY

## 2020-11-12 PROCEDURE — 99999 PR PBB SHADOW E&M-EST. PATIENT-LVL II: CPT | Mod: PBBFAC,,, | Performed by: OBSTETRICS & GYNECOLOGY

## 2020-11-12 PROCEDURE — 76801 OB US < 14 WKS SINGLE FETUS: CPT | Mod: S$GLB,,, | Performed by: OBSTETRICS & GYNECOLOGY

## 2020-11-12 PROCEDURE — 76801 PR US, OB <14WKS, TRANSABD, SINGLE GESTATION: ICD-10-PCS | Mod: S$GLB,,, | Performed by: OBSTETRICS & GYNECOLOGY

## 2020-11-12 NOTE — PROGRESS NOTES
OBSTETRIC HISTORY:   OB History        3    Para   2    Term   2            AB   1    Living   2       SAB        TAB        Ectopic        Multiple        Live Births   2                COMPREHENSIVE GYN HISTORY:  PAP History: Denies abnormal Paps.  Infection History: Chlamydia. Denies PID.  Benign History: Denies uterine fibroids. Denies ovarian cysts. Denies endometriosis.   Cancer History: Denies cervical cancer. Denies uterine cancer or hyperplasia. Denies ovarian cancer. Denies vulvar cancer or pre-cancer. Denies vaginal cancer or pre-cancer. Denies breast cancer. Denies colon cancer.  Sexual Activity History:   reports that she currently engages in sexual activity and has had partner(s) who are Male.   Menstrual History: Every 30 days, flows for 2 days. Light flow.  Dysmenorrhea History: Reports no dysmenorrhea.  Contraception History: None       HPI:   45 y.o.  No LMP recorded.   Patient is  here for ultrasound she is having spotting. She had an US 10/29/20 that showed a 7 week fetal pole with +fetal heart beat but was noted to have a low rate. She is here for follow up US and there is no longer a fetal heart beat.    ROS:  GENERAL: Denies weight gain or weight loss. Feeling well overall.   SKIN: Denies rash or lesions.   HEAD: Denies headache.   NODES: Denies enlarged lymph nodes.   CHEST: Denies shortness of breath.   ABDOMEN: No abdominal pain, constipation, diarrhea, nausea, vomiting or rectal bleeding.   URINARY: No frequency, dysuria, hematuria, or burning on urination.  REPRODUCTIVE: See HPI.   BREASTS: The patient denies pain, lumps, or nipple discharge.   HEMATOLOGIC: No easy bruisability.   MUSCULOSKELETAL: Denies joint pain or back pain.   NEUROLOGIC: Denies weakness.   PSYCHIATRIC: Denies depression, anxiety or mood swings.    PE:   /68   Wt 69.9 kg (154 lb)   BMI 25.63 kg/m²   Talk only for 5 minutes about causes of missed  and in her case age is most common.  Needs to stop Prometrium. Patient had recent missed  and did Cytotec. Will discuss options at visit on .    ASSESSMENT:  Missed     PLAN:  RTO  at 1pm

## 2020-11-16 ENCOUNTER — OFFICE VISIT (OUTPATIENT)
Dept: OBSTETRICS AND GYNECOLOGY | Facility: CLINIC | Age: 45
End: 2020-11-16
Payer: COMMERCIAL

## 2020-11-16 VITALS
HEIGHT: 65 IN | SYSTOLIC BLOOD PRESSURE: 98 MMHG | BODY MASS INDEX: 25.47 KG/M2 | WEIGHT: 152.88 LBS | DIASTOLIC BLOOD PRESSURE: 60 MMHG

## 2020-11-16 DIAGNOSIS — O02.1 MISSED AB: Primary | ICD-10-CM

## 2020-11-16 DIAGNOSIS — O03.4 INCOMPLETE ABORTION: ICD-10-CM

## 2020-11-16 PROCEDURE — 99211 PR OFFICE/OUTPT VISIT, EST, LEVL I: ICD-10-PCS | Mod: S$GLB,,, | Performed by: OBSTETRICS & GYNECOLOGY

## 2020-11-16 PROCEDURE — 3008F BODY MASS INDEX DOCD: CPT | Mod: CPTII,S$GLB,, | Performed by: OBSTETRICS & GYNECOLOGY

## 2020-11-16 PROCEDURE — 99999 PR PBB SHADOW E&M-EST. PATIENT-LVL III: ICD-10-PCS | Mod: PBBFAC,,, | Performed by: OBSTETRICS & GYNECOLOGY

## 2020-11-16 PROCEDURE — 3008F PR BODY MASS INDEX (BMI) DOCUMENTED: ICD-10-PCS | Mod: CPTII,S$GLB,, | Performed by: OBSTETRICS & GYNECOLOGY

## 2020-11-16 PROCEDURE — 99999 PR PBB SHADOW E&M-EST. PATIENT-LVL III: CPT | Mod: PBBFAC,,, | Performed by: OBSTETRICS & GYNECOLOGY

## 2020-11-16 PROCEDURE — 1126F PR PAIN SEVERITY QUANTIFIED, NO PAIN PRESENT: ICD-10-PCS | Mod: S$GLB,,, | Performed by: OBSTETRICS & GYNECOLOGY

## 2020-11-16 PROCEDURE — 99211 OFF/OP EST MAY X REQ PHY/QHP: CPT | Mod: S$GLB,,, | Performed by: OBSTETRICS & GYNECOLOGY

## 2020-11-16 PROCEDURE — 1126F AMNT PAIN NOTED NONE PRSNT: CPT | Mod: S$GLB,,, | Performed by: OBSTETRICS & GYNECOLOGY

## 2020-11-16 RX ORDER — MISOPROSTOL 200 UG/1
400 TABLET ORAL EVERY 6 HOURS
Qty: 56 TABLET | Refills: 1 | Status: SHIPPED | OUTPATIENT
Start: 2020-11-16 | End: 2021-07-29

## 2020-11-16 RX ORDER — HYDROCODONE BITARTRATE AND ACETAMINOPHEN 5; 325 MG/1; MG/1
1 TABLET ORAL EVERY 4 HOURS PRN
Qty: 12 TABLET | Refills: 0 | Status: SHIPPED | OUTPATIENT
Start: 2020-11-16 | End: 2021-07-29

## 2020-11-16 NOTE — PROGRESS NOTES
"  OBSTETRIC HISTORY:   OB History        3    Para   2    Term   2            AB   1    Living   2       SAB        TAB        Ectopic        Multiple        Live Births   2                COMPREHENSIVE GYN HISTORY:  PAP History: Denies abnormal Paps.  Infection History: Chlamydia. Denies PID.  Benign History: Denies uterine fibroids. Denies ovarian cysts. Denies endometriosis.   Cancer History: Denies cervical cancer. Denies uterine cancer or hyperplasia. Denies ovarian cancer. Denies vulvar cancer or pre-cancer. Denies vaginal cancer or pre-cancer. Denies breast cancer. Denies colon cancer.  Sexual Activity History:   reports that she currently engages in sexual activity and has had partner(s) who are Male.   Menstrual History: Every 30 days, flows for 2 days. Light flow.  Dysmenorrhea History: Reports no dysmenorrhea.  Contraception History: None         HPI:   45 y.o.  No LMP recorded (lmp unknown).   Patient is  here to discuss options regarding treatment of failed pregnancy. After reviewing consent for suction D&C patient prefers to do Cytotec which is what she did with her last failed pregnancy. She is having some spotting. She has been instructed no sexual activity until negative BHCG.    ROS:  GENERAL: Denies weight gain or weight loss. Feeling well overall.   SKIN: Denies rash or lesions.   HEAD: Denies headache.   NODES: Denies enlarged lymph nodes.   CHEST: Denies shortness of breath.   ABDOMEN: No abdominal pain, constipation, diarrhea, nausea, vomiting or rectal bleeding.   URINARY: No frequency, dysuria, hematuria, or burning on urination.  REPRODUCTIVE: See HPI.   BREASTS: The patient denies pain, lumps, or nipple discharge.   HEMATOLOGIC: No easy bruisability.   MUSCULOSKELETAL: Denies joint pain or back pain.   NEUROLOGIC: Denies weakness.   PSYCHIATRIC: Denies depression, anxiety or mood swings.    PE:   BP 98/60   Ht 5' 5" (1.651 m)   Wt 69.4 kg (152 lb 14.4 oz)   LMP  (LMP " Unknown)   BMI 25.44 kg/m²   APPEARANCE: Well nourished, well developed, in no acute distress.  Talk only x 6 minutes.      ASSESSMENT:  Missed     PLAN:  BHCG to be repeated   Patient instructed on Cytotec  May still end up with D&C

## 2020-11-30 ENCOUNTER — TELEPHONE (OUTPATIENT)
Dept: OBSTETRICS AND GYNECOLOGY | Facility: HOSPITAL | Age: 45
End: 2020-11-30

## 2020-11-30 ENCOUNTER — LAB VISIT (OUTPATIENT)
Dept: LAB | Facility: HOSPITAL | Age: 45
End: 2020-11-30
Attending: OBSTETRICS & GYNECOLOGY
Payer: COMMERCIAL

## 2020-11-30 DIAGNOSIS — O02.1 MISSED AB: ICD-10-CM

## 2020-11-30 DIAGNOSIS — O03.4 INCOMPLETE ABORTION: Primary | ICD-10-CM

## 2020-11-30 LAB — HCG INTACT+B SERPL-ACNC: 16 MIU/ML

## 2020-11-30 PROCEDURE — 84702 CHORIONIC GONADOTROPIN TEST: CPT

## 2020-11-30 PROCEDURE — 36415 COLL VENOUS BLD VENIPUNCTURE: CPT

## 2020-11-30 NOTE — TELEPHONE ENCOUNTER
Notify hormone of pregnancy is down to 16. It needs to be down to less than 5 to be negative. Needs repeat in 1-2 weeks. Orders signed.  I don't think she needs to take more Cytotec at this time.

## 2020-12-14 ENCOUNTER — PATIENT MESSAGE (OUTPATIENT)
Dept: OBSTETRICS AND GYNECOLOGY | Facility: CLINIC | Age: 45
End: 2020-12-14

## 2021-05-04 ENCOUNTER — PATIENT MESSAGE (OUTPATIENT)
Dept: RESEARCH | Facility: HOSPITAL | Age: 46
End: 2021-05-04

## 2021-06-15 ENCOUNTER — TELEPHONE (OUTPATIENT)
Dept: ADMINISTRATIVE | Facility: OTHER | Age: 46
End: 2021-06-15

## 2021-07-29 ENCOUNTER — OFFICE VISIT (OUTPATIENT)
Dept: OBSTETRICS AND GYNECOLOGY | Facility: CLINIC | Age: 46
End: 2021-07-29
Payer: COMMERCIAL

## 2021-07-29 VITALS
SYSTOLIC BLOOD PRESSURE: 114 MMHG | BODY MASS INDEX: 27.18 KG/M2 | HEIGHT: 65 IN | WEIGHT: 163.13 LBS | DIASTOLIC BLOOD PRESSURE: 74 MMHG

## 2021-07-29 DIAGNOSIS — Z34.90 EARLY STAGE OF PREGNANCY: Primary | ICD-10-CM

## 2021-07-29 DIAGNOSIS — O09.299 HISTORY OF SPONTANEOUS ABORTION, CURRENTLY PREGNANT: ICD-10-CM

## 2021-07-29 PROCEDURE — 1160F RVW MEDS BY RX/DR IN RCRD: CPT | Mod: CPTII,S$GLB,, | Performed by: OBSTETRICS & GYNECOLOGY

## 2021-07-29 PROCEDURE — 1159F PR MEDICATION LIST DOCUMENTED IN MEDICAL RECORD: ICD-10-PCS | Mod: CPTII,S$GLB,, | Performed by: OBSTETRICS & GYNECOLOGY

## 2021-07-29 PROCEDURE — 3074F SYST BP LT 130 MM HG: CPT | Mod: CPTII,S$GLB,, | Performed by: OBSTETRICS & GYNECOLOGY

## 2021-07-29 PROCEDURE — 87491 CHLMYD TRACH DNA AMP PROBE: CPT | Performed by: OBSTETRICS & GYNECOLOGY

## 2021-07-29 PROCEDURE — 3078F DIAST BP <80 MM HG: CPT | Mod: CPTII,S$GLB,, | Performed by: OBSTETRICS & GYNECOLOGY

## 2021-07-29 PROCEDURE — 99203 OFFICE O/P NEW LOW 30 MIN: CPT | Mod: S$GLB,,, | Performed by: OBSTETRICS & GYNECOLOGY

## 2021-07-29 PROCEDURE — 1126F AMNT PAIN NOTED NONE PRSNT: CPT | Mod: CPTII,S$GLB,, | Performed by: OBSTETRICS & GYNECOLOGY

## 2021-07-29 PROCEDURE — 1126F PR PAIN SEVERITY QUANTIFIED, NO PAIN PRESENT: ICD-10-PCS | Mod: CPTII,S$GLB,, | Performed by: OBSTETRICS & GYNECOLOGY

## 2021-07-29 PROCEDURE — 99203 PR OFFICE/OUTPT VISIT, NEW, LEVL III, 30-44 MIN: ICD-10-PCS | Mod: S$GLB,,, | Performed by: OBSTETRICS & GYNECOLOGY

## 2021-07-29 PROCEDURE — 3008F PR BODY MASS INDEX (BMI) DOCUMENTED: ICD-10-PCS | Mod: CPTII,S$GLB,, | Performed by: OBSTETRICS & GYNECOLOGY

## 2021-07-29 PROCEDURE — 1160F PR REVIEW ALL MEDS BY PRESCRIBER/CLIN PHARMACIST DOCUMENTED: ICD-10-PCS | Mod: CPTII,S$GLB,, | Performed by: OBSTETRICS & GYNECOLOGY

## 2021-07-29 PROCEDURE — 87591 N.GONORRHOEAE DNA AMP PROB: CPT | Performed by: OBSTETRICS & GYNECOLOGY

## 2021-07-29 PROCEDURE — 99999 PR PBB SHADOW E&M-EST. PATIENT-LVL III: ICD-10-PCS | Mod: PBBFAC,,, | Performed by: OBSTETRICS & GYNECOLOGY

## 2021-07-29 PROCEDURE — 3074F PR MOST RECENT SYSTOLIC BLOOD PRESSURE < 130 MM HG: ICD-10-PCS | Mod: CPTII,S$GLB,, | Performed by: OBSTETRICS & GYNECOLOGY

## 2021-07-29 PROCEDURE — 3008F BODY MASS INDEX DOCD: CPT | Mod: CPTII,S$GLB,, | Performed by: OBSTETRICS & GYNECOLOGY

## 2021-07-29 PROCEDURE — 3078F PR MOST RECENT DIASTOLIC BLOOD PRESSURE < 80 MM HG: ICD-10-PCS | Mod: CPTII,S$GLB,, | Performed by: OBSTETRICS & GYNECOLOGY

## 2021-07-29 PROCEDURE — 99999 PR PBB SHADOW E&M-EST. PATIENT-LVL III: CPT | Mod: PBBFAC,,, | Performed by: OBSTETRICS & GYNECOLOGY

## 2021-07-29 PROCEDURE — 1159F MED LIST DOCD IN RCRD: CPT | Mod: CPTII,S$GLB,, | Performed by: OBSTETRICS & GYNECOLOGY

## 2021-08-03 LAB
C TRACH DNA SPEC QL NAA+PROBE: NOT DETECTED
N GONORRHOEA DNA SPEC QL NAA+PROBE: NOT DETECTED

## 2021-08-20 ENCOUNTER — PATIENT MESSAGE (OUTPATIENT)
Dept: OBSTETRICS AND GYNECOLOGY | Facility: CLINIC | Age: 46
End: 2021-08-20

## 2021-08-26 ENCOUNTER — INITIAL PRENATAL (OUTPATIENT)
Dept: OBSTETRICS AND GYNECOLOGY | Facility: CLINIC | Age: 46
End: 2021-08-26
Payer: COMMERCIAL

## 2021-08-26 VITALS
WEIGHT: 163.13 LBS | SYSTOLIC BLOOD PRESSURE: 120 MMHG | BODY MASS INDEX: 27.15 KG/M2 | DIASTOLIC BLOOD PRESSURE: 70 MMHG

## 2021-08-26 DIAGNOSIS — O09.299 HISTORY OF SPONTANEOUS ABORTION, CURRENTLY PREGNANT: ICD-10-CM

## 2021-08-26 DIAGNOSIS — Z87.59 HISTORY OF POOR PREGNANCY OUTCOME: ICD-10-CM

## 2021-08-26 DIAGNOSIS — O02.1 EMBRYONIC DEMISE: Primary | ICD-10-CM

## 2021-08-26 PROCEDURE — 99999 PR PBB SHADOW E&M-EST. PATIENT-LVL III: CPT | Mod: PBBFAC,,, | Performed by: OBSTETRICS & GYNECOLOGY

## 2021-08-26 PROCEDURE — 76815 PR  US,PREGNANT UTERUS,LIMITED, 1/> FETUSES: ICD-10-PCS | Mod: S$GLB,,, | Performed by: OBSTETRICS & GYNECOLOGY

## 2021-08-26 PROCEDURE — 99999 PR PBB SHADOW E&M-EST. PATIENT-LVL III: ICD-10-PCS | Mod: PBBFAC,,, | Performed by: OBSTETRICS & GYNECOLOGY

## 2021-08-26 PROCEDURE — 76815 OB US LIMITED FETUS(S): CPT | Mod: S$GLB,,, | Performed by: OBSTETRICS & GYNECOLOGY

## 2021-08-26 PROCEDURE — 99213 PR OFFICE/OUTPT VISIT, EST, LEVL III, 20-29 MIN: ICD-10-PCS | Mod: 25,S$GLB,, | Performed by: OBSTETRICS & GYNECOLOGY

## 2021-08-26 PROCEDURE — 99213 OFFICE O/P EST LOW 20 MIN: CPT | Mod: 25,S$GLB,, | Performed by: OBSTETRICS & GYNECOLOGY

## 2021-09-27 ENCOUNTER — PATIENT MESSAGE (OUTPATIENT)
Dept: OBSTETRICS AND GYNECOLOGY | Facility: CLINIC | Age: 46
End: 2021-09-27

## 2021-10-05 ENCOUNTER — OFFICE VISIT (OUTPATIENT)
Dept: OBSTETRICS AND GYNECOLOGY | Facility: CLINIC | Age: 46
End: 2021-10-05
Payer: COMMERCIAL

## 2021-10-05 VITALS
DIASTOLIC BLOOD PRESSURE: 62 MMHG | SYSTOLIC BLOOD PRESSURE: 112 MMHG | BODY MASS INDEX: 27.18 KG/M2 | WEIGHT: 163.13 LBS | HEIGHT: 65 IN

## 2021-10-05 DIAGNOSIS — N96 HISTORY OF RECURRENT SPONTANEOUS ABORTION, NOT CURRENTLY PREGNANT: Primary | ICD-10-CM

## 2021-10-05 PROCEDURE — 99999 PR PBB SHADOW E&M-EST. PATIENT-LVL III: ICD-10-PCS | Mod: PBBFAC,,, | Performed by: OBSTETRICS & GYNECOLOGY

## 2021-10-05 PROCEDURE — 99213 OFFICE O/P EST LOW 20 MIN: CPT | Mod: S$GLB,,, | Performed by: OBSTETRICS & GYNECOLOGY

## 2021-10-05 PROCEDURE — 99213 PR OFFICE/OUTPT VISIT, EST, LEVL III, 20-29 MIN: ICD-10-PCS | Mod: S$GLB,,, | Performed by: OBSTETRICS & GYNECOLOGY

## 2021-10-05 PROCEDURE — 1160F PR REVIEW ALL MEDS BY PRESCRIBER/CLIN PHARMACIST DOCUMENTED: ICD-10-PCS | Mod: CPTII,S$GLB,, | Performed by: OBSTETRICS & GYNECOLOGY

## 2021-10-05 PROCEDURE — 3078F PR MOST RECENT DIASTOLIC BLOOD PRESSURE < 80 MM HG: ICD-10-PCS | Mod: CPTII,S$GLB,, | Performed by: OBSTETRICS & GYNECOLOGY

## 2021-10-05 PROCEDURE — 3008F BODY MASS INDEX DOCD: CPT | Mod: CPTII,S$GLB,, | Performed by: OBSTETRICS & GYNECOLOGY

## 2021-10-05 PROCEDURE — 1159F PR MEDICATION LIST DOCUMENTED IN MEDICAL RECORD: ICD-10-PCS | Mod: CPTII,S$GLB,, | Performed by: OBSTETRICS & GYNECOLOGY

## 2021-10-05 PROCEDURE — 99999 PR PBB SHADOW E&M-EST. PATIENT-LVL III: CPT | Mod: PBBFAC,,, | Performed by: OBSTETRICS & GYNECOLOGY

## 2021-10-05 PROCEDURE — 3074F PR MOST RECENT SYSTOLIC BLOOD PRESSURE < 130 MM HG: ICD-10-PCS | Mod: CPTII,S$GLB,, | Performed by: OBSTETRICS & GYNECOLOGY

## 2021-10-05 PROCEDURE — 3078F DIAST BP <80 MM HG: CPT | Mod: CPTII,S$GLB,, | Performed by: OBSTETRICS & GYNECOLOGY

## 2021-10-05 PROCEDURE — 1160F RVW MEDS BY RX/DR IN RCRD: CPT | Mod: CPTII,S$GLB,, | Performed by: OBSTETRICS & GYNECOLOGY

## 2021-10-05 PROCEDURE — 3074F SYST BP LT 130 MM HG: CPT | Mod: CPTII,S$GLB,, | Performed by: OBSTETRICS & GYNECOLOGY

## 2021-10-05 PROCEDURE — 3008F PR BODY MASS INDEX (BMI) DOCUMENTED: ICD-10-PCS | Mod: CPTII,S$GLB,, | Performed by: OBSTETRICS & GYNECOLOGY

## 2021-10-05 PROCEDURE — 1159F MED LIST DOCD IN RCRD: CPT | Mod: CPTII,S$GLB,, | Performed by: OBSTETRICS & GYNECOLOGY

## 2022-03-04 ENCOUNTER — OFFICE VISIT (OUTPATIENT)
Dept: OBSTETRICS AND GYNECOLOGY | Facility: CLINIC | Age: 47
End: 2022-03-04
Payer: COMMERCIAL

## 2022-03-04 VITALS
WEIGHT: 160.94 LBS | HEIGHT: 65 IN | BODY MASS INDEX: 26.81 KG/M2 | SYSTOLIC BLOOD PRESSURE: 102 MMHG | DIASTOLIC BLOOD PRESSURE: 60 MMHG

## 2022-03-04 DIAGNOSIS — N92.0 MENORRHAGIA WITH REGULAR CYCLE: Primary | ICD-10-CM

## 2022-03-04 DIAGNOSIS — N96 HISTORY OF RECURRENT SPONTANEOUS ABORTION, NOT CURRENTLY PREGNANT: ICD-10-CM

## 2022-03-04 PROCEDURE — 3074F SYST BP LT 130 MM HG: CPT | Mod: CPTII,S$GLB,, | Performed by: OBSTETRICS & GYNECOLOGY

## 2022-03-04 PROCEDURE — 3008F BODY MASS INDEX DOCD: CPT | Mod: CPTII,S$GLB,, | Performed by: OBSTETRICS & GYNECOLOGY

## 2022-03-04 PROCEDURE — 3078F DIAST BP <80 MM HG: CPT | Mod: CPTII,S$GLB,, | Performed by: OBSTETRICS & GYNECOLOGY

## 2022-03-04 PROCEDURE — 3074F PR MOST RECENT SYSTOLIC BLOOD PRESSURE < 130 MM HG: ICD-10-PCS | Mod: CPTII,S$GLB,, | Performed by: OBSTETRICS & GYNECOLOGY

## 2022-03-04 PROCEDURE — 3008F PR BODY MASS INDEX (BMI) DOCUMENTED: ICD-10-PCS | Mod: CPTII,S$GLB,, | Performed by: OBSTETRICS & GYNECOLOGY

## 2022-03-04 PROCEDURE — 99999 PR PBB SHADOW E&M-EST. PATIENT-LVL III: CPT | Mod: PBBFAC,,, | Performed by: OBSTETRICS & GYNECOLOGY

## 2022-03-04 PROCEDURE — 1159F PR MEDICATION LIST DOCUMENTED IN MEDICAL RECORD: ICD-10-PCS | Mod: CPTII,S$GLB,, | Performed by: OBSTETRICS & GYNECOLOGY

## 2022-03-04 PROCEDURE — 3078F PR MOST RECENT DIASTOLIC BLOOD PRESSURE < 80 MM HG: ICD-10-PCS | Mod: CPTII,S$GLB,, | Performed by: OBSTETRICS & GYNECOLOGY

## 2022-03-04 PROCEDURE — 1159F MED LIST DOCD IN RCRD: CPT | Mod: CPTII,S$GLB,, | Performed by: OBSTETRICS & GYNECOLOGY

## 2022-03-04 PROCEDURE — 99213 OFFICE O/P EST LOW 20 MIN: CPT | Mod: S$GLB,,, | Performed by: OBSTETRICS & GYNECOLOGY

## 2022-03-04 PROCEDURE — 99999 PR PBB SHADOW E&M-EST. PATIENT-LVL III: ICD-10-PCS | Mod: PBBFAC,,, | Performed by: OBSTETRICS & GYNECOLOGY

## 2022-03-04 PROCEDURE — 99213 PR OFFICE/OUTPT VISIT, EST, LEVL III, 20-29 MIN: ICD-10-PCS | Mod: S$GLB,,, | Performed by: OBSTETRICS & GYNECOLOGY

## 2022-03-04 RX ORDER — TRANEXAMIC ACID 650 MG/1
1300 TABLET ORAL 3 TIMES DAILY
Qty: 30 TABLET | Refills: 11 | Status: SHIPPED | OUTPATIENT
Start: 2022-03-04 | End: 2022-07-26

## 2022-03-04 NOTE — PROGRESS NOTES
Subjective:       Patient ID: Herminia Melo is a 46 y.o. female.    Chief Complaint:  Menstrual Problem (Pt is concerned with cycles lasting for 5 days and is really heavy for the past 2 months.)      History of Present Illness  HPI  Patient comes in today complaining of heavy menses for the past couple of months.  Very heavy even with larger pads.  No pain.  Just heavy bleeding    History of recurrent spontaneous .  Has not had her work-up      GYN & OB History  Patient's last menstrual period was 2022 (exact date).   Date of Last Pap: 10/20/2020    OB History    Para Term  AB Living   5 2 2   3 2   SAB IAB Ectopic Multiple Live Births   1       2      # Outcome Date GA Lbr Kulwinder/2nd Weight Sex Delivery Anes PTL Lv   5 SAB 2021           4 AB 2020 8w0d    SAB      3 AB 20 10w0d    SAB      2 Term 10/12/10   3.175 kg (7 lb) F Vag-Spont None N LIDA   1 Term 04/15/05   2.722 kg (6 lb) F Vag-Spont EPI N LIDA     Past Medical History:   Diagnosis Date    Cervical high risk HPV (human papillomavirus) test positive 2020    REPEAT CO-TETING IN 1 YEAR       Past Surgical History:   Procedure Laterality Date    COLONOSCOPY      pt unsure when and why       Family History   Problem Relation Age of Onset    Cancer Paternal Grandmother     Diabetes Father     Hypertension Mother     Sickle cell trait Daughter        Social History     Socioeconomic History    Marital status: Single   Tobacco Use    Smoking status: Former Smoker    Smokeless tobacco: Former User   Substance and Sexual Activity    Alcohol use: Yes     Comment: not since     Drug use: Never    Sexual activity: Yes     Partners: Male     Birth control/protection: None   Social History Narrative    Together since 2018    He is a  for the [employer]    She works for [employer].  [employer]       Current Outpatient Medications   Medication Sig Dispense Refill     multivit,calc,mins/iron/folic (ONE-A-DAY WOMENS FORMULA ORAL) Take by mouth.       No current facility-administered medications for this visit.       Review of patient's allergies indicates:  No Known Allergies    Review of Systems  Review of Systems   Constitutional: Negative for activity change, appetite change, chills, fatigue, fever and unexpected weight change.   HENT: Negative for mouth sores.    Respiratory: Negative for cough, shortness of breath and wheezing.    Cardiovascular: Negative for chest pain and palpitations.   Gastrointestinal: Negative for abdominal pain, bloating, blood in stool, constipation, nausea and vomiting.   Endocrine: Negative for diabetes and hot flashes.   Genitourinary: Positive for menorrhagia and menstrual problem. Negative for dysmenorrhea, dyspareunia, dysuria, frequency, hematuria, pelvic pain, urgency, vaginal bleeding, vaginal discharge, vaginal pain, urinary incontinence, postcoital bleeding and vaginal odor.   Musculoskeletal: Negative for back pain and myalgias.   Integumentary:  Negative for rash, breast mass and nipple discharge.   Neurological: Negative for seizures and headaches.   Psychiatric/Behavioral: Negative for depression and sleep disturbance. The patient is not nervous/anxious.    Breast: Negative for mass, mastodynia and nipple discharge          Objective:    Physical Exam:   Constitutional: She appears well-developed and well-nourished. No distress.   BMI of 26.8    HENT:   Head: Normocephalic and atraumatic.    Eyes: EOM are normal.      Pulmonary/Chest: Effort normal. No respiratory distress.            Abdominal: Soft. She exhibits no distension. There is no abdominal tenderness. There is no rebound and no guarding.     Genitourinary:    Vagina and uterus normal.   No  no vaginal discharge in the vagina.    Genitourinary Comments: Vulva without any obvious lesions.  Urethral meatus normal size and location without any lesion.  Urethra is non-tender  without stricture or discharge.  Bladder is non-tender.  Vaginal vault with good support.  Minimal white discharge noted.  No obvious lesion.  Normal rugation.  Cervix is without any cervical motion tenderness.  No obvious lesion.  Uterus is slightly enlarged, retroverted, minimally tender, normal contour.  Adnexa is without any masses or tenderness.  Perineum without obvious lesion.               Musculoskeletal: Normal range of motion.       Neurological: She is alert.    Skin: Skin is warm and dry.    Psychiatric: She has a normal mood and affect.          Assessment:        1. Menorrhagia with regular cycle    2. History of recurrent spontaneous , not currently pregnant              Plan:      I have discussed with the patient regarding her condition.  She is still trying to get pregnant.  Does not want to be on any hormonal methods to control her menorrhagia  Will try tranexamic acid.  Specific instructions given  We discussed bleeding  Will get pelvic ultrasound  CBC at the end of next menses    Discussed work-up for recurrent Abs.  Labs ordered     Back in 2 months

## 2022-03-16 ENCOUNTER — LAB VISIT (OUTPATIENT)
Dept: LAB | Facility: HOSPITAL | Age: 47
End: 2022-03-16
Attending: OBSTETRICS & GYNECOLOGY
Payer: COMMERCIAL

## 2022-03-16 ENCOUNTER — PATIENT MESSAGE (OUTPATIENT)
Dept: OBSTETRICS AND GYNECOLOGY | Facility: CLINIC | Age: 47
End: 2022-03-16
Payer: COMMERCIAL

## 2022-03-16 DIAGNOSIS — N96 HISTORY OF RECURRENT SPONTANEOUS ABORTION, NOT CURRENTLY PREGNANT: ICD-10-CM

## 2022-03-16 DIAGNOSIS — N92.0 MENORRHAGIA WITH REGULAR CYCLE: ICD-10-CM

## 2022-03-16 LAB
BASOPHILS # BLD AUTO: 0.05 K/UL (ref 0–0.2)
BASOPHILS NFR BLD: 1 % (ref 0–1.9)
DIFFERENTIAL METHOD: ABNORMAL
EOSINOPHIL # BLD AUTO: 0.2 K/UL (ref 0–0.5)
EOSINOPHIL NFR BLD: 3.1 % (ref 0–8)
ERYTHROCYTE [DISTWIDTH] IN BLOOD BY AUTOMATED COUNT: 12 % (ref 11.5–14.5)
HCT VFR BLD AUTO: 35.1 % (ref 37–48.5)
HGB BLD-MCNC: 10.6 G/DL (ref 12–16)
IMM GRANULOCYTES # BLD AUTO: 0.02 K/UL (ref 0–0.04)
IMM GRANULOCYTES NFR BLD AUTO: 0.4 % (ref 0–0.5)
LYMPHOCYTES # BLD AUTO: 1.6 K/UL (ref 1–4.8)
LYMPHOCYTES NFR BLD: 29.9 % (ref 18–48)
MCH RBC QN AUTO: 26.2 PG (ref 27–31)
MCHC RBC AUTO-ENTMCNC: 30.2 G/DL (ref 32–36)
MCV RBC AUTO: 87 FL (ref 82–98)
MONOCYTES # BLD AUTO: 0.4 K/UL (ref 0.3–1)
MONOCYTES NFR BLD: 8.5 % (ref 4–15)
NEUTROPHILS # BLD AUTO: 3 K/UL (ref 1.8–7.7)
NEUTROPHILS NFR BLD: 57.1 % (ref 38–73)
NRBC BLD-RTO: 0 /100 WBC
PLATELET # BLD AUTO: 310 K/UL (ref 150–450)
PMV BLD AUTO: 9.7 FL (ref 9.2–12.9)
RBC # BLD AUTO: 4.04 M/UL (ref 4–5.4)
TSH SERPL DL<=0.005 MIU/L-ACNC: 1.43 UIU/ML (ref 0.4–4)
WBC # BLD AUTO: 5.18 K/UL (ref 3.9–12.7)

## 2022-03-16 PROCEDURE — 85025 COMPLETE CBC W/AUTO DIFF WBC: CPT | Performed by: OBSTETRICS & GYNECOLOGY

## 2022-03-16 PROCEDURE — 88230 TISSUE CULTURE LYMPHOCYTE: CPT | Performed by: OBSTETRICS & GYNECOLOGY

## 2022-03-16 PROCEDURE — 84443 ASSAY THYROID STIM HORMONE: CPT | Performed by: OBSTETRICS & GYNECOLOGY

## 2022-03-16 PROCEDURE — 86235 NUCLEAR ANTIGEN ANTIBODY: CPT | Mod: 59 | Performed by: OBSTETRICS & GYNECOLOGY

## 2022-03-16 PROCEDURE — 86039 ANTINUCLEAR ANTIBODIES (ANA): CPT | Performed by: OBSTETRICS & GYNECOLOGY

## 2022-03-16 PROCEDURE — 86147 CARDIOLIPIN ANTIBODY EA IG: CPT | Performed by: OBSTETRICS & GYNECOLOGY

## 2022-03-16 PROCEDURE — 86038 ANTINUCLEAR ANTIBODIES: CPT | Performed by: OBSTETRICS & GYNECOLOGY

## 2022-03-16 PROCEDURE — 85730 THROMBOPLASTIN TIME PARTIAL: CPT | Performed by: OBSTETRICS & GYNECOLOGY

## 2022-03-16 PROCEDURE — 86376 MICROSOMAL ANTIBODY EACH: CPT | Performed by: OBSTETRICS & GYNECOLOGY

## 2022-03-16 PROCEDURE — 84436 ASSAY OF TOTAL THYROXINE: CPT | Performed by: OBSTETRICS & GYNECOLOGY

## 2022-03-16 PROCEDURE — 88262 CHROMOSOME ANALYSIS 15-20: CPT | Performed by: OBSTETRICS & GYNECOLOGY

## 2022-03-17 LAB
T4 SERPL-MCNC: 5.9 UG/DL (ref 4.5–11.5)
THYROPEROXIDASE IGG SERPL-ACNC: <6 IU/ML

## 2022-03-18 LAB
ANA PATTERN 1: NORMAL
ANA SER QL IF: POSITIVE
ANA TITR SER IF: NORMAL {TITER}

## 2022-03-21 LAB
CARDIOLIPIN IGG SER IA-ACNC: <9.4 GPL (ref 0–14.99)
CARDIOLIPIN IGM SER IA-ACNC: 15.6 MPL (ref 0–12.49)

## 2022-03-24 LAB
ANTI SM ANTIBODY: 0.13 RATIO (ref 0–0.99)
ANTI SM/RNP ANTIBODY: 0.14 RATIO (ref 0–0.99)
ANTI-SM INTERPRETATION: NEGATIVE
ANTI-SM/RNP INTERPRETATION: NEGATIVE
ANTI-SSA ANTIBODY: 0.07 RATIO (ref 0–0.99)
ANTI-SSA INTERPRETATION: NEGATIVE
ANTI-SSB ANTIBODY: 0.07 RATIO (ref 0–0.99)
ANTI-SSB INTERPRETATION: NEGATIVE
DSDNA AB SER-ACNC: NORMAL [IU]/ML

## 2022-03-25 LAB
APTT IMM NP PPP: NORMAL SEC (ref 32–48)
APTT P HEP NEUT PPP: NORMAL SEC (ref 32–48)
CONFIRM APTT STACLOT: NORMAL
DRVVT SCREEN TO CONFIRM RATIO: NORMAL RATIO
LA 3 SCREEN W REFLEX-IMP: NORMAL
LA NT DPL PPP QL: NORMAL
MIXING DRVVT: NORMAL SEC (ref 33–44)
PROTHROMBIN TIME: 12.9 SEC (ref 12–15.5)
REPTILASE TIME: NORMAL SEC
SCREEN APTT: 42 SEC (ref 32–48)
SCREEN DRVVT: 39 SEC (ref 33–44)
THROMBIN TIME: NORMAL SEC (ref 14.7–19.5)

## 2022-03-28 LAB
CHROM BANDING METHOD: NORMAL
CHROMOSOME ANALYSIS CONGENITAL ADDITIONAL INFORMATION: NORMAL
CHROMOSOME ANALYSIS CONGENITAL RELEASED BY: NORMAL
CHROMOSOME ANALYSIS CONGENITAL RESULT SUMMARY: NORMAL
CLINICAL CYTOGENETICIST REVIEW: NORMAL
KARYOTYP SPEC: NORMAL
REASON FOR REFERRAL, CHROMOSOME ANALYSIS CONGENITAL: NORMAL
REF LAB TEST METHOD: NORMAL
SPECIMEN SOURCE: NORMAL
SPECIMEN, CHROMOSOME ANALYSIS CONGENITAL: NORMAL

## 2022-04-06 ENCOUNTER — PATIENT MESSAGE (OUTPATIENT)
Dept: OBSTETRICS AND GYNECOLOGY | Facility: CLINIC | Age: 47
End: 2022-04-06
Payer: COMMERCIAL

## 2022-04-11 ENCOUNTER — OFFICE VISIT (OUTPATIENT)
Dept: OBSTETRICS AND GYNECOLOGY | Facility: CLINIC | Age: 47
End: 2022-04-11
Payer: COMMERCIAL

## 2022-04-11 VITALS
WEIGHT: 159.81 LBS | HEIGHT: 65 IN | SYSTOLIC BLOOD PRESSURE: 102 MMHG | DIASTOLIC BLOOD PRESSURE: 62 MMHG | BODY MASS INDEX: 26.63 KG/M2

## 2022-04-11 DIAGNOSIS — N96 HISTORY OF RECURRENT SPONTANEOUS ABORTION, NOT CURRENTLY PREGNANT: Primary | ICD-10-CM

## 2022-04-11 PROCEDURE — 99999 PR PBB SHADOW E&M-EST. PATIENT-LVL III: CPT | Mod: PBBFAC,,, | Performed by: OBSTETRICS & GYNECOLOGY

## 2022-04-11 PROCEDURE — 3078F PR MOST RECENT DIASTOLIC BLOOD PRESSURE < 80 MM HG: ICD-10-PCS | Mod: CPTII,S$GLB,, | Performed by: OBSTETRICS & GYNECOLOGY

## 2022-04-11 PROCEDURE — 3074F SYST BP LT 130 MM HG: CPT | Mod: CPTII,S$GLB,, | Performed by: OBSTETRICS & GYNECOLOGY

## 2022-04-11 PROCEDURE — 99213 PR OFFICE/OUTPT VISIT, EST, LEVL III, 20-29 MIN: ICD-10-PCS | Mod: S$GLB,,, | Performed by: OBSTETRICS & GYNECOLOGY

## 2022-04-11 PROCEDURE — 1159F MED LIST DOCD IN RCRD: CPT | Mod: CPTII,S$GLB,, | Performed by: OBSTETRICS & GYNECOLOGY

## 2022-04-11 PROCEDURE — 99213 OFFICE O/P EST LOW 20 MIN: CPT | Mod: S$GLB,,, | Performed by: OBSTETRICS & GYNECOLOGY

## 2022-04-11 PROCEDURE — 3074F PR MOST RECENT SYSTOLIC BLOOD PRESSURE < 130 MM HG: ICD-10-PCS | Mod: CPTII,S$GLB,, | Performed by: OBSTETRICS & GYNECOLOGY

## 2022-04-11 PROCEDURE — 99999 PR PBB SHADOW E&M-EST. PATIENT-LVL III: ICD-10-PCS | Mod: PBBFAC,,, | Performed by: OBSTETRICS & GYNECOLOGY

## 2022-04-11 PROCEDURE — 3008F BODY MASS INDEX DOCD: CPT | Mod: CPTII,S$GLB,, | Performed by: OBSTETRICS & GYNECOLOGY

## 2022-04-11 PROCEDURE — 1159F PR MEDICATION LIST DOCUMENTED IN MEDICAL RECORD: ICD-10-PCS | Mod: CPTII,S$GLB,, | Performed by: OBSTETRICS & GYNECOLOGY

## 2022-04-11 PROCEDURE — 3078F DIAST BP <80 MM HG: CPT | Mod: CPTII,S$GLB,, | Performed by: OBSTETRICS & GYNECOLOGY

## 2022-04-11 PROCEDURE — 3008F PR BODY MASS INDEX (BMI) DOCUMENTED: ICD-10-PCS | Mod: CPTII,S$GLB,, | Performed by: OBSTETRICS & GYNECOLOGY

## 2022-04-11 NOTE — PROGRESS NOTES
Subjective:       Patient ID: Herminia Melo is a 46 y.o. female.    Chief Complaint:  Follow-up (Follow up lab work)      History of Present Illness  HPI  Patient comes in today for follow-up  History of multiple spontaneous abortions    Pelvic ultrasound on 3/8/22 was essentially normal with hemorrhagic corpus luteum cyst on the right    Blood work was normal except for mild elevation in KATHI at 15 and positive LIGIA    No complaint today      GYN & OB History  Patient's last menstrual period was 2022 (exact date).   Date of Last Pap: 10/20/2020    OB History    Para Term  AB Living   5 2 2   3 2   SAB IAB Ectopic Multiple Live Births   1       2      # Outcome Date GA Lbr Kulwinder/2nd Weight Sex Delivery Anes PTL Lv   5 SAB 2021           4 AB 2020 8w0d    SAB      3 AB 20 10w0d    SAB      2 Term 10/12/10   3.175 kg (7 lb) F Vag-Spont None N LIDA   1 Term 04/15/05   2.722 kg (6 lb) F Vag-Spont EPI N LIDA     Past Medical History:   Diagnosis Date    Cervical high risk HPV (human papillomavirus) test positive 2020    REPEAT CO-TETING IN 1 YEAR       Past Surgical History:   Procedure Laterality Date    COLONOSCOPY      pt unsure when and why       Family History   Problem Relation Age of Onset    Cancer Paternal Grandmother     Diabetes Father     Hypertension Mother     Sickle cell trait Daughter        Social History     Socioeconomic History    Marital status: Single   Tobacco Use    Smoking status: Former Smoker    Smokeless tobacco: Former User   Substance and Sexual Activity    Alcohol use: Yes     Comment: not since     Drug use: Never    Sexual activity: Yes     Partners: Male     Birth control/protection: None   Social History Narrative    Together since 2018    He is a  for the [employer]    She works for [employer].  [employer]       Current Outpatient Medications   Medication Sig Dispense Refill    multivit,calc,mins/iron/folic  (ONE-A-DAY WOMENS FORMULA ORAL) Take by mouth.      tranexamic acid (LYSTEDA) 650 mg tablet Take 2 tablets (1,300 mg total) by mouth 3 (three) times daily. 30 tablet 11     No current facility-administered medications for this visit.       Review of patient's allergies indicates:  No Known Allergies      Review of Systems  Review of Systems   Constitutional: Negative for activity change, appetite change, chills, fatigue, fever and unexpected weight change.   HENT: Negative for mouth sores.    Respiratory: Negative for cough, shortness of breath and wheezing.    Cardiovascular: Negative for chest pain and palpitations.   Gastrointestinal: Negative for abdominal pain, bloating, blood in stool, constipation, nausea and vomiting.   Endocrine: Negative for diabetes and hot flashes.   Genitourinary: Positive for menorrhagia and menstrual problem. Negative for dysmenorrhea, dyspareunia, dysuria, frequency, hematuria, pelvic pain, urgency, vaginal bleeding, vaginal discharge, vaginal pain, urinary incontinence, postcoital bleeding and vaginal odor.   Musculoskeletal: Negative for back pain and myalgias.   Integumentary:  Negative for rash, breast mass and nipple discharge.   Neurological: Negative for seizures and headaches.   Psychiatric/Behavioral: Negative for depression and sleep disturbance. The patient is not nervous/anxious.    Breast: Negative for mass, mastodynia and nipple discharge          Objective:    Physical Exam:   Constitutional: She appears well-developed and well-nourished. No distress.    HENT:   Head: Normocephalic and atraumatic.    Eyes: EOM are normal.     Cardiovascular: Normal rate.     Pulmonary/Chest: Effort normal. No respiratory distress.                  Musculoskeletal: Normal range of motion.       Neurological: She is alert.    Skin: Skin is warm and dry.    Psychiatric: She has a normal mood and affect.          Assessment:        1. History of recurrent spontaneous , not  currently pregnant              Plan:      I have discussed with the patient regarding her condition.  We discussed results.  Nothing significant  She still would like to try to have another child  Will continue with her prenatal vitamins  Consider baby aspirin  Consider work-up for her     Back with a positive UPT

## 2022-06-29 ENCOUNTER — PATIENT MESSAGE (OUTPATIENT)
Dept: OBSTETRICS AND GYNECOLOGY | Facility: CLINIC | Age: 47
End: 2022-06-29
Payer: COMMERCIAL

## 2022-07-26 ENCOUNTER — OFFICE VISIT (OUTPATIENT)
Dept: OBSTETRICS AND GYNECOLOGY | Facility: CLINIC | Age: 47
End: 2022-07-26
Payer: COMMERCIAL

## 2022-07-26 VITALS
BODY MASS INDEX: 28.21 KG/M2 | HEIGHT: 65 IN | SYSTOLIC BLOOD PRESSURE: 130 MMHG | DIASTOLIC BLOOD PRESSURE: 70 MMHG | WEIGHT: 169.31 LBS

## 2022-07-26 DIAGNOSIS — Z32.00 VISIT FOR CONFIRMATION OF PREGNANCY TEST RESULT WITH PHYSICAL EXAM: Primary | ICD-10-CM

## 2022-07-26 DIAGNOSIS — O09.299 HISTORY OF SPONTANEOUS ABORTION, CURRENTLY PREGNANT: ICD-10-CM

## 2022-07-26 DIAGNOSIS — O09.521 MULTIGRAVIDA OF ADVANCED MATERNAL AGE IN FIRST TRIMESTER: ICD-10-CM

## 2022-07-26 DIAGNOSIS — O26.21 HISTORY OF RECURRENT ABORTION, CURRENTLY PREGNANT IN FIRST TRIMESTER: ICD-10-CM

## 2022-07-26 LAB
B-HCG UR QL: POSITIVE
CTP QC/QA: YES

## 2022-07-26 PROCEDURE — 1160F RVW MEDS BY RX/DR IN RCRD: CPT | Mod: CPTII,S$GLB,, | Performed by: OBSTETRICS & GYNECOLOGY

## 2022-07-26 PROCEDURE — 3078F DIAST BP <80 MM HG: CPT | Mod: CPTII,S$GLB,, | Performed by: OBSTETRICS & GYNECOLOGY

## 2022-07-26 PROCEDURE — 76815 PR  US,PREGNANT UTERUS,LIMITED, 1/> FETUSES: ICD-10-PCS | Mod: S$GLB,,, | Performed by: OBSTETRICS & GYNECOLOGY

## 2022-07-26 PROCEDURE — 99999 PR PBB SHADOW E&M-EST. PATIENT-LVL III: ICD-10-PCS | Mod: PBBFAC,,, | Performed by: OBSTETRICS & GYNECOLOGY

## 2022-07-26 PROCEDURE — 3078F PR MOST RECENT DIASTOLIC BLOOD PRESSURE < 80 MM HG: ICD-10-PCS | Mod: CPTII,S$GLB,, | Performed by: OBSTETRICS & GYNECOLOGY

## 2022-07-26 PROCEDURE — 81025 POCT URINE PREGNANCY: ICD-10-PCS | Mod: S$GLB,,, | Performed by: OBSTETRICS & GYNECOLOGY

## 2022-07-26 PROCEDURE — 3075F SYST BP GE 130 - 139MM HG: CPT | Mod: CPTII,S$GLB,, | Performed by: OBSTETRICS & GYNECOLOGY

## 2022-07-26 PROCEDURE — 87591 N.GONORRHOEAE DNA AMP PROB: CPT | Performed by: OBSTETRICS & GYNECOLOGY

## 2022-07-26 PROCEDURE — 99213 PR OFFICE/OUTPT VISIT, EST, LEVL III, 20-29 MIN: ICD-10-PCS | Mod: 25,S$GLB,, | Performed by: OBSTETRICS & GYNECOLOGY

## 2022-07-26 PROCEDURE — 3008F PR BODY MASS INDEX (BMI) DOCUMENTED: ICD-10-PCS | Mod: CPTII,S$GLB,, | Performed by: OBSTETRICS & GYNECOLOGY

## 2022-07-26 PROCEDURE — 1159F MED LIST DOCD IN RCRD: CPT | Mod: CPTII,S$GLB,, | Performed by: OBSTETRICS & GYNECOLOGY

## 2022-07-26 PROCEDURE — 76815 OB US LIMITED FETUS(S): CPT | Mod: S$GLB,,, | Performed by: OBSTETRICS & GYNECOLOGY

## 2022-07-26 PROCEDURE — 81025 URINE PREGNANCY TEST: CPT | Mod: S$GLB,,, | Performed by: OBSTETRICS & GYNECOLOGY

## 2022-07-26 PROCEDURE — 3075F PR MOST RECENT SYSTOLIC BLOOD PRESS GE 130-139MM HG: ICD-10-PCS | Mod: CPTII,S$GLB,, | Performed by: OBSTETRICS & GYNECOLOGY

## 2022-07-26 PROCEDURE — 3008F BODY MASS INDEX DOCD: CPT | Mod: CPTII,S$GLB,, | Performed by: OBSTETRICS & GYNECOLOGY

## 2022-07-26 PROCEDURE — 99999 PR PBB SHADOW E&M-EST. PATIENT-LVL III: CPT | Mod: PBBFAC,,, | Performed by: OBSTETRICS & GYNECOLOGY

## 2022-07-26 PROCEDURE — 1159F PR MEDICATION LIST DOCUMENTED IN MEDICAL RECORD: ICD-10-PCS | Mod: CPTII,S$GLB,, | Performed by: OBSTETRICS & GYNECOLOGY

## 2022-07-26 PROCEDURE — 99213 OFFICE O/P EST LOW 20 MIN: CPT | Mod: 25,S$GLB,, | Performed by: OBSTETRICS & GYNECOLOGY

## 2022-07-26 PROCEDURE — 1160F PR REVIEW ALL MEDS BY PRESCRIBER/CLIN PHARMACIST DOCUMENTED: ICD-10-PCS | Mod: CPTII,S$GLB,, | Performed by: OBSTETRICS & GYNECOLOGY

## 2022-07-26 PROCEDURE — 87491 CHLMYD TRACH DNA AMP PROBE: CPT | Performed by: OBSTETRICS & GYNECOLOGY

## 2022-07-26 RX ORDER — LACTOBACILLUS COMBINATION NO.9 4B CELL
CAPSULE ORAL
COMMUNITY

## 2022-07-26 NOTE — PATIENT INSTRUCTIONS
We discussed management plan for Advanced Maternal Age.  1.  MFM evaluation   2.  Genetic screening, [id number] vs Quad screen  3.  Baby aspirin beginning at around 12 weeks  4.  Ultrasounds at next visit, at 20 weeks, then again at 32 weeks for growth and fluid.  5.  If patient is over 39 yo, she would need NST twice a week with MVP weekly vs NST once a week and BPP weekly.  6.  Deliver by 39-40 weeks.

## 2022-07-26 NOTE — PROGRESS NOTES
Subjective:       Patient ID: Herminia Melo is a 46 y.o. female.    Chief Complaint:  Confirmation (UPT- Positive LMP: 2022 BOYD: 2023 EGA: 8 weeks 3 days.)      History of Present Illness  HPI  Patient has a positive home urine pregnancy test on 2022. She believes she could be pregnant. Pregnancy is desired. Sexual Activity: single partner, contraception: none. Current symptoms also include: breast tenderness, fatigue, frequent urination, nausea, headache and back pain with positive home pregnancy test. Last period was normal.     Patient's last menstrual period was 2023 (exact date).     By date, she should be about 8w3d with EDC on 3/4/2023      GYN & OB History  Patient's last menstrual period was 2022 (exact date).   Date of Last Pap: 10/20/2020    OB History    Para Term  AB Living   6 2 2   3 2   SAB IAB Ectopic Multiple Live Births   1       2      # Outcome Date GA Lbr Kulwinder/2nd Weight Sex Delivery Anes PTL Lv   6 Current            5 SAB 2021           4 AB 2020 8w0d    SAB      3 AB 20 10w0d    SAB      2 Term 10/12/10   3.175 kg (7 lb) F Vag-Spont None N LIDA   1 Term 04/15/05   2.722 kg (6 lb) F Vag-Spont EPI N LIDA     Past Medical History:   Diagnosis Date    Cervical high risk HPV (human papillomavirus) test positive 2020    REPEAT CO-TETING IN 1 YEAR       Past Surgical History:   Procedure Laterality Date    COLONOSCOPY      pt unsure when and why       Family History   Problem Relation Age of Onset    Cancer Paternal Grandmother     Diabetes Father     Hypertension Mother     Sickle cell trait Daughter        Social History     Socioeconomic History    Marital status: Single   Tobacco Use    Smoking status: Former Smoker    Smokeless tobacco: Former User   Substance and Sexual Activity    Alcohol use: Yes     Comment: not since     Drug use: Never    Sexual activity: Yes     Partners: Male     Birth control/protection: None    Social History Narrative    Together since 2018    He is a  for [employer]    She works for [employer].  [employer]       Current Outpatient Medications   Medication Sig Dispense Refill    [id number]-dha-epa-fish oil (PRENATAL GUMMY) 400 mcg-35 mg -25 mg-5 mg Chew Take by mouth.      multivit,calc,mins/iron/folic (ONE-A-DAY WOMENS FORMULA ORAL) Take by mouth.       No current facility-administered medications for this visit.       Review of patient's allergies indicates:  No Known Allergies    Review of Systems  Review of Systems   Constitutional: Positive for fatigue. Negative for activity change, appetite change, fever and unexpected weight change.   Respiratory: Negative for cough, shortness of breath and wheezing.    Cardiovascular: Negative for chest pain and palpitations.   Gastrointestinal: Positive for abdominal pain and nausea. Negative for vomiting.   Endocrine: Negative for hot flashes.   Genitourinary: Positive for frequency, pelvic pain and vaginal discharge. Negative for dysmenorrhea, dyspareunia, urgency, vaginal bleeding and postcoital bleeding.   Musculoskeletal: Positive for back pain. Negative for myalgias.   Integumentary:  Negative for rash, breast mass and nipple discharge.   Neurological: Positive for headaches. Negative for seizures.   Psychiatric/Behavioral: Negative for depression and sleep disturbance. The patient is not nervous/anxious.    Breast: Negative for mass, mastodynia and nipple discharge          Objective:    Physical Exam:   Constitutional: She appears well-developed and well-nourished. No distress.   BMI of     HENT:   Head: Normocephalic and atraumatic.    Eyes: EOM are normal.      Pulmonary/Chest: Effort normal. No respiratory distress.   Breasts: Non-tender, no engorgement, no masses, no retraction, no discharge. Negative for lymphadenopathy.         Abdominal: Soft. She exhibits no distension. There is no abdominal tenderness. There is no  rebound and no guarding.     Genitourinary:    Vagina normal.   No  no vaginal discharge in the vagina.    Genitourinary Comments: Vulva without any obvious lesions.  Urethral meatus normal size and location without any lesion.  Urethra is non-tender without stricture or discharge.  Bladder is non-tender.  Vaginal vault with good support.  Minimal white discharge noted.  No obvious lesion.  Normal rugation.  Cervix is without any cervical motion tenderness.  No obvious lesion.  Uterus is about 8-10 weeks, non-tender, normal contour.  Adnexa is without any masses or tenderness.  Perineum without obvious lesion.               Musculoskeletal: Normal range of motion.       Neurological: She is alert.    Skin: Skin is warm and dry.    Psychiatric: She has a normal mood and affect.        LIMITED FOCUS ULTRASOUND    Date: 2022    Indications:   Ultrasound performed for fetal viability.    Findings:   *A transabdominal ultrasound performed showing normal fetus.  CRL: 2.03 cm   EGA: 8w4d  Placenta: anterior  FHT at 174  EDC:  3/3/23     Plan:   See below       Assessment:        1. Visit for confirmation of pregnancy test result with physical exam    2. Multigravida of advanced maternal age in first trimester    3. History of spontaneous , currently pregnant    4. History of recurrent , currently pregnant in first trimester                Plan:      I have discussed with the patient her condition  She is doing well so far in her early pregnancy  She is taking over-the-counter prenatal vitamins; she will continue  Gonorrhea and chlamydia performed  We will contact her insurance for maternity benefits  [facility] ultrasound requested    We discussed management plan for Advanced Maternal Age.  1.  [facility] evaluation   2.  Genetic screening, [id number] vs Quad screen.  [id number] ordered for next week at 9weeks  3.  Baby aspirin beginning at around 12 weeks  4.  Ultrasounds at next visit, at 20 weeks, then again at  32 weeks for growth and fluid.  5.  Patient is over 41 yo, she would need NST twice a week with MVP weekly vs NST once a week and BPP weekly.  6.  Deliver by 39-40 weeks.    She will be back in about 2-4 weeks for follow-up

## 2022-07-27 NOTE — PROGRESS NOTES
LIMITED FOCUS ULTRASOUND    Date: 7/26/2022    Indications:   Ultrasound performed for fetal viability.    Findings:   *A transabdominal ultrasound performed showing normal fetus.  CRL: 2.03 cm   EGA: 8w4d  Placenta: anterior  FHT at 174  EDC:  3/3/23     Plan:   See below

## 2022-07-30 LAB
C TRACH DNA SPEC QL NAA+PROBE: NOT DETECTED
N GONORRHOEA DNA SPEC QL NAA+PROBE: NOT DETECTED

## 2022-08-01 ENCOUNTER — PATIENT MESSAGE (OUTPATIENT)
Dept: OBSTETRICS AND GYNECOLOGY | Facility: CLINIC | Age: 47
End: 2022-08-01
Payer: COMMERCIAL

## 2022-08-01 PROBLEM — O26.21 RECURRENT PREGNANCY LOSS IN PREGNANT PATIENT IN FIRST TRIMESTER, ANTEPARTUM: Status: ACTIVE | Noted: 2022-08-01

## 2022-08-01 PROBLEM — O09.521 MULTIGRAVIDA OF ADVANCED MATERNAL AGE IN FIRST TRIMESTER: Status: ACTIVE | Noted: 2022-08-01

## 2022-08-02 ENCOUNTER — PROCEDURE VISIT (OUTPATIENT)
Dept: MATERNAL FETAL MEDICINE | Facility: CLINIC | Age: 47
End: 2022-08-02
Payer: COMMERCIAL

## 2022-08-02 ENCOUNTER — OFFICE VISIT (OUTPATIENT)
Dept: MATERNAL FETAL MEDICINE | Facility: CLINIC | Age: 47
End: 2022-08-02
Payer: COMMERCIAL

## 2022-08-02 VITALS
BODY MASS INDEX: 27.92 KG/M2 | WEIGHT: 167.56 LBS | DIASTOLIC BLOOD PRESSURE: 82 MMHG | HEIGHT: 65 IN | SYSTOLIC BLOOD PRESSURE: 110 MMHG

## 2022-08-02 DIAGNOSIS — O26.21 HISTORY OF RECURRENT ABORTION, CURRENTLY PREGNANT IN FIRST TRIMESTER: ICD-10-CM

## 2022-08-02 DIAGNOSIS — Z32.00 VISIT FOR CONFIRMATION OF PREGNANCY TEST RESULT WITH PHYSICAL EXAM: ICD-10-CM

## 2022-08-02 DIAGNOSIS — O09.521 MULTIGRAVIDA OF ADVANCED MATERNAL AGE IN FIRST TRIMESTER: ICD-10-CM

## 2022-08-02 DIAGNOSIS — Z36.89 ENCOUNTER FOR FETAL ANATOMIC SURVEY: Primary | ICD-10-CM

## 2022-08-02 DIAGNOSIS — O26.21 RECURRENT PREGNANCY LOSS IN PREGNANT PATIENT IN FIRST TRIMESTER, ANTEPARTUM: ICD-10-CM

## 2022-08-02 DIAGNOSIS — O09.299 HISTORY OF SPONTANEOUS ABORTION, CURRENTLY PREGNANT: ICD-10-CM

## 2022-08-02 PROCEDURE — 76801 US MFM PROCEDURE (VIEWPOINT): ICD-10-PCS | Mod: S$GLB,,, | Performed by: OBSTETRICS & GYNECOLOGY

## 2022-08-02 PROCEDURE — 99999 PR PBB SHADOW E&M-EST. PATIENT-LVL III: CPT | Mod: PBBFAC,,, | Performed by: OBSTETRICS & GYNECOLOGY

## 2022-08-02 PROCEDURE — 3008F PR BODY MASS INDEX (BMI) DOCUMENTED: ICD-10-PCS | Mod: CPTII,S$GLB,, | Performed by: OBSTETRICS & GYNECOLOGY

## 2022-08-02 PROCEDURE — 3079F PR MOST RECENT DIASTOLIC BLOOD PRESSURE 80-89 MM HG: ICD-10-PCS | Mod: CPTII,S$GLB,, | Performed by: OBSTETRICS & GYNECOLOGY

## 2022-08-02 PROCEDURE — 3074F PR MOST RECENT SYSTOLIC BLOOD PRESSURE < 130 MM HG: ICD-10-PCS | Mod: CPTII,S$GLB,, | Performed by: OBSTETRICS & GYNECOLOGY

## 2022-08-02 PROCEDURE — 76801 OB US < 14 WKS SINGLE FETUS: CPT | Mod: S$GLB,,, | Performed by: OBSTETRICS & GYNECOLOGY

## 2022-08-02 PROCEDURE — 3008F BODY MASS INDEX DOCD: CPT | Mod: CPTII,S$GLB,, | Performed by: OBSTETRICS & GYNECOLOGY

## 2022-08-02 PROCEDURE — 99214 PR OFFICE/OUTPT VISIT, EST, LEVL IV, 30-39 MIN: ICD-10-PCS | Mod: 25,S$GLB,, | Performed by: OBSTETRICS & GYNECOLOGY

## 2022-08-02 PROCEDURE — 3079F DIAST BP 80-89 MM HG: CPT | Mod: CPTII,S$GLB,, | Performed by: OBSTETRICS & GYNECOLOGY

## 2022-08-02 PROCEDURE — 99999 PR PBB SHADOW E&M-EST. PATIENT-LVL III: ICD-10-PCS | Mod: PBBFAC,,, | Performed by: OBSTETRICS & GYNECOLOGY

## 2022-08-02 PROCEDURE — 99214 OFFICE O/P EST MOD 30 MIN: CPT | Mod: 25,S$GLB,, | Performed by: OBSTETRICS & GYNECOLOGY

## 2022-08-02 PROCEDURE — 1159F MED LIST DOCD IN RCRD: CPT | Mod: CPTII,S$GLB,, | Performed by: OBSTETRICS & GYNECOLOGY

## 2022-08-02 PROCEDURE — 1159F PR MEDICATION LIST DOCUMENTED IN MEDICAL RECORD: ICD-10-PCS | Mod: CPTII,S$GLB,, | Performed by: OBSTETRICS & GYNECOLOGY

## 2022-08-02 PROCEDURE — 3074F SYST BP LT 130 MM HG: CPT | Mod: CPTII,S$GLB,, | Performed by: OBSTETRICS & GYNECOLOGY

## 2022-08-02 RX ORDER — ASPIRIN 81 MG/1
81 TABLET ORAL DAILY
Status: ON HOLD | COMMUNITY
End: 2023-02-28 | Stop reason: HOSPADM

## 2022-08-02 NOTE — ASSESSMENT & PLAN NOTE
G1&2: Uncomplicated term SVDs  G3-5: SABs < 10 weeks x 3 (all maternal age > 40 y, no chromosome analysis recorded)  - Normal maternal chromosome analysis, FOB not evaluated  - Negative DRVVT, indeterminate anticardiolipin IgM (15.6), beta 2 glycoprotein not assessed     Normal early dating scan today, though assessment limited by early gestational age. Management of recurrent pregnancy loss is one of the most heartbreaking conditions patients can face.  Unfortunately, most workups do not yield a definitive etiology.  That being said, subsequent attempts at pregnancy are often successful.  Work up options are limited, as are therapeutic interventions. Around 60% of miscarriages are associated with sporadic chromosomal anomalies; this proportion is higher for patients >40 years. For couples with recurrent miscarriage, around 2-5% will test positive for a balanced or Robertsonian translocation on peripheral blood draw; while Herminia has a normal karyotype her partner has not yet been tested. While he has fathered chromosomally normal children before this does not eliminate the possibility of a translocation. Metabolic diseases, such as diabetes and thyroid disorders are associated with miscarriage, particularly when uncontrolled. 2/3 of an antiphospholipid work up are negative. A uterine assessment via saline-infused sonohysterogram or 3D transvaginal US can be informative, particularly in the case of losses >10 weeks. There is some evidence that progesterone supplementation in the 1st trimester can be protective against the current pregnancy loss. Per a 2019 [facility] review evaluating oral, vaginal and intramuscular progesterone, supplementation before 14 weeks found a significant reduction in recurrent miscarriage among women with recurrent pregnancy loss. There route and dose of progesterone varied greatly, though most of the recent studies had women on either dydrogesterone 20 mg daily (either in whole or split  doses), oral micronized progesterone 200 mg daily or vaginal micronized progesterone 400 mg daily.   Dr. Briscoe has correctly counseled that age and chromosomal anomalies are the most likely etiology. Indeterminate anticardiolipin IgM is likely not related to her RPL.    Recommendations:  1. Consider b-2-glycoprotein IgM/IgG assessment; repeat cardiolipin IgM as well (positive for either would warrant prophylactic anticoagulation during pregnancy plus increased antepartum surveillance)  2. Consider paternal chromosome analysis  3. Consider progesterone in the first trimester as above

## 2022-08-02 NOTE — PROGRESS NOTES
"MATERNAL-FETAL MEDICINE   CONSULT NOTE    Provider requesting consultation: Dr. Briscoe    SUBJECTIVE:     Ms. Herminia Melo is a 46 y.o.  female with IUP at 9w3d who is seen in consultation by MFM for evaluation and management of:  Problem   Multigravida of Advanced Maternal Age in First Trimester   Recurrent Pregnancy Loss in Pregnant Patient in First Trimester, Antepartum            Medication List with Changes/Refills   Current Medications    ASPIRIN (ECOTRIN) 81 MG EC TABLET    Take 81 mg by mouth once daily.    PNV62-FA-OM3-DHA-EPA-FISH OIL (PRENATAL GUMMY) 400 MCG-35 MG -25 MG-5 MG CHEW    Take by mouth.       Review of patient's allergies indicates:  No Known Allergies    PMH:  Past Medical History:   Diagnosis Date    Cervical high risk HPV (human papillomavirus) test positive 2020    REPEAT CO-TETING IN 1 YEAR       PObHx:  OB History    Para Term  AB Living   6 2 2   3 2   SAB IAB Ectopic Multiple Live Births   1       2      # Outcome Date GA Lbr Kulwinder/2nd Weight Sex Delivery Anes PTL Lv   6 Current            5 SAB 2021 7w2d    SAB      4 AB 2020 8w0d    SAB      3 AB 20 10w0d    SAB      2 Term 10/12/10   3.175 kg (7 lb) F Vag-Spont None N LIDA   1 Term 04/15/05   2.722 kg (6 lb) F Vag-Spont EPI N LIDA       PSH:  Past Surgical History:   Procedure Laterality Date    COLONOSCOPY      pt unsure when and why       Family history:family history includes Cancer in her paternal grandmother; Diabetes in her father; Hypertension in her mother; Sickle cell trait in her daughter.    Social history: reports that she has never smoked. She has quit using smokeless tobacco. She reports current alcohol use. She reports that she does not use drugs.    Genetic history: The patient denies any inherited genetic diseases or birth defects in herself or her partner's personal history or family.    Objective:   /82 (BP Location: Left arm)   Ht 5' 5" (1.651 m)   Wt 76 kg (167 lb " 8.8 oz)   LMP 2022 (Exact Date)   BMI 27.88 kg/m²     Ultrasound performed. See viewpoint for full ultrasound report.  1. A montesinos living IUP is present.   2. Size is consistent with established dating.   3. The maternal adnexae are normal in appearance.     ASSESSMENT/PLAN:     46 y.o.  female with IUP at 9w3d     Recurrent pregnancy loss in pregnant patient in first trimester, antepartum  G1&2: Uncomplicated term SVDs  G3-5: SABs < 10 weeks x 3 (all maternal age > 40 y, no chromosome analysis recorded)  - Normal maternal chromosome analysis, FOB not evaluated  - Negative DRVVT, indeterminate anticardiolipin IgM (15.6), beta 2 glycoprotein not assessed     Normal early dating scan today, though assessment limited by early gestational age. Management of recurrent pregnancy loss is one of the most heartbreaking conditions patients can face.  Unfortunately, most workups do not yield a definitive etiology.  That being said, subsequent attempts at pregnancy are often successful.  Work up options are limited, as are therapeutic interventions. Around 60% of miscarriages are associated with sporadic chromosomal anomalies; this proportion is higher for patients >40 years. For couples with recurrent miscarriage, around 2-5% will test positive for a balanced or Robertsonian translocation on peripheral blood draw; while Herminia has a normal karyotype her partner has not yet been tested. While he has fathered chromosomally normal children before this does not eliminate the possibility of a translocation. Metabolic diseases, such as diabetes and thyroid disorders are associated with miscarriage, particularly when uncontrolled. 2/3 of an antiphospholipid work up are negative. A uterine assessment via saline-infused sonohysterogram or 3D transvaginal US can be informative, particularly in the case of losses >10 weeks. There is some evidence that progesterone supplementation in the 1st trimester can be protective  against the current pregnancy loss. Per a 2019 [facility] review evaluating oral, vaginal and intramuscular progesterone, supplementation before 14 weeks found a significant reduction in recurrent miscarriage among women with recurrent pregnancy loss. There route and dose of progesterone varied greatly, though most of the recent studies had women on either dydrogesterone 20 mg daily (either in whole or split doses), oral micronized progesterone 200 mg daily or vaginal micronized progesterone 400 mg daily.   Dr. Briscoe has correctly counseled that age and chromosomal anomalies are the most likely etiology. Indeterminate anticardiolipin IgM is likely not related to her RPL.    Recommendations:  1. Consider b-2-glycoprotein IgM/IgG assessment; repeat cardiolipin IgM as well (positive for either would warrant prophylactic anticoagulation during pregnancy plus increased antepartum surveillance)  2. Consider paternal chromosome analysis  3. Consider progesterone in the first trimester as above    Multigravida of advanced maternal age in first trimester  Age-related Down Syndrome risk (DSR): 1:30  Chromosome screening: planning cell free DNA, to be drawn this week  Today I counseled the patient on the relationship between maternal age and adverse pregnancy outcomes. The most prominent is fetal aneuploidy.   Ultrasound is a mediocre screening tool for aneuploidy, missing about 50% of cases of trisomy 21. We discussed non-invasive screening options, including integrated screening (1T NT + first and second trimester analytes), serum integrated (first and second trimester analytes), second trimester quad or penta screen and cell free DNA screening. Amniocentesis or chorionic villus sampling for karyotype or chromosomal microarray is also an option.   As above, Herminia elected cell free DNA.  Additional risks associated with maternal age greater than 35 include preeclampsia, gestational diabetes, and fetal growth restriction.   Maternal age greater than 40 is associated with increased risk of stillbirth. There is no established preventative measures against gestational diabetes once gestation is already started.  Early onset preeclampsia can be prevented in high risk patients with daily baby aspirin.  Fetal growth restriction is screened for with 3rd trimester ultrasound.     Recommendations:  1. Cell free DNA to be drawn this week  2. Detailed anatomic survey at 19-20 weeks    3. Start ASA 81 mg daily  4. Follow with fetal ultrasound at 32-34 weeks for interval fetal growth  5. Initiate weekly  fetal surveillance at 32 weeks   6. Delivery timing is 39-40 weeks in the absence of indication for earlier delivery      FOLLOW UP:   F/u in 10 weeks for US  No further MFM visits were scheduled      Shamir Ashley III  Maternal-Fetal Medicine    Electronically Signed by Shamir Ashley III 2022

## 2022-08-02 NOTE — ASSESSMENT & PLAN NOTE
Age-related Down Syndrome risk (DSR): 1:30  Chromosome screening: planning cell free DNA, to be drawn this week  Today I counseled the patient on the relationship between maternal age and adverse pregnancy outcomes. The most prominent is fetal aneuploidy.   Ultrasound is a mediocre screening tool for aneuploidy, missing about 50% of cases of trisomy 21. We discussed non-invasive screening options, including integrated screening (1T NT + first and second trimester analytes), serum integrated (first and second trimester analytes), second trimester quad or penta screen and cell free DNA screening. Amniocentesis or chorionic villus sampling for karyotype or chromosomal microarray is also an option.   As above, Herminia elected cell free DNA.  Additional risks associated with maternal age greater than 35 include preeclampsia, gestational diabetes, and fetal growth restriction.  Maternal age greater than 40 is associated with increased risk of stillbirth. There is no established preventative measures against gestational diabetes once gestation is already started.  Early onset preeclampsia can be prevented in high risk patients with daily baby aspirin.  Fetal growth restriction is screened for with 3rd trimester ultrasound.     Recommendations:  1. Cell free DNA to be drawn this week  2. Detailed anatomic survey at 19-20 weeks    3. Start ASA 81 mg daily  4. Follow with fetal ultrasound at 32-34 weeks for interval fetal growth  5. Initiate weekly  fetal surveillance at 32 weeks   6. Delivery timing is 39-40 weeks in the absence of indication for earlier delivery

## 2022-08-25 ENCOUNTER — INITIAL PRENATAL (OUTPATIENT)
Dept: OBSTETRICS AND GYNECOLOGY | Facility: CLINIC | Age: 47
End: 2022-08-25
Payer: COMMERCIAL

## 2022-08-25 VITALS
DIASTOLIC BLOOD PRESSURE: 66 MMHG | BODY MASS INDEX: 27.51 KG/M2 | SYSTOLIC BLOOD PRESSURE: 112 MMHG | WEIGHT: 165.38 LBS

## 2022-08-25 DIAGNOSIS — O09.299 HISTORY OF SPONTANEOUS ABORTION, CURRENTLY PREGNANT: ICD-10-CM

## 2022-08-25 DIAGNOSIS — Z3A.12 12 WEEKS GESTATION OF PREGNANCY: Primary | ICD-10-CM

## 2022-08-25 DIAGNOSIS — O09.521 MULTIGRAVIDA OF ADVANCED MATERNAL AGE IN FIRST TRIMESTER: ICD-10-CM

## 2022-08-25 PROCEDURE — 99999 PR PBB SHADOW E&M-EST. PATIENT-LVL III: ICD-10-PCS | Mod: PBBFAC,,, | Performed by: OBSTETRICS & GYNECOLOGY

## 2022-08-25 PROCEDURE — 0502F PR SUBSEQUENT PRENATAL CARE: ICD-10-PCS | Mod: CPTII,S$GLB,, | Performed by: OBSTETRICS & GYNECOLOGY

## 2022-08-25 PROCEDURE — 0502F SUBSEQUENT PRENATAL CARE: CPT | Mod: CPTII,S$GLB,, | Performed by: OBSTETRICS & GYNECOLOGY

## 2022-08-25 PROCEDURE — 99999 PR PBB SHADOW E&M-EST. PATIENT-LVL III: CPT | Mod: PBBFAC,,, | Performed by: OBSTETRICS & GYNECOLOGY

## 2022-08-25 RX ORDER — PROGESTERONE 200 MG/1
400 CAPSULE ORAL NIGHTLY
Qty: 60 CAPSULE | Refills: 6 | Status: ON HOLD | OUTPATIENT
Start: 2022-08-25 | End: 2023-02-28 | Stop reason: HOSPADM

## 2022-08-25 NOTE — PROGRESS NOTES
12w5d  Patient comes in today with her  to begin care  History of ruba Riggins  Seen by MFM, Dr Ashley.  Rec noted    Past Medical History:   Diagnosis Date    Cervical high risk HPV (human papillomavirus) test positive 09/28/2020    REPEAT CO-TETING IN 1 YEAR       Past Surgical History:   Procedure Laterality Date    COLONOSCOPY      pt unsure when and why       Family History   Problem Relation Age of Onset    Cancer Paternal Grandmother     Diabetes Father     Hypertension Mother     Sickle cell trait Daughter        Social History     Socioeconomic History    Marital status: Single   Tobacco Use    Smoking status: Never Smoker    Smokeless tobacco: Former User   Substance and Sexual Activity    Alcohol use: Yes     Comment: not since 2019    Drug use: Never    Sexual activity: Yes     Partners: Male     Birth control/protection: None   Social History Narrative    Together since 2018    He is a  for the [employer]    She works for [employer]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      PNV62-FA-om3-dha-epa-fish oil (PRENATAL GUMMY) 400 mcg-35 mg -25 mg-5 mg Chew Take by mouth.      progesterone (PROMETRIUM) 200 MG capsule Take 2 capsules (400 mg total) by mouth nightly. 60 capsule 6     No current facility-administered medications for this visit.       Review of patient's allergies indicates:  No Known Allergies    Review of Systems   Constitutional: Positive for fatigue. Negative for fever, chills, appetite change and unexpected weight change.   HENT: Positive for congestion. Negative for hearing loss, ear pain, sore throat, rhinorrhea, sneezing, mouth sores, neck pain, neck stiffness, voice change and postnasal drip.   Eyes: Negative for photophobia, itching and visual disturbance.   Respiratory: Negative for cough, chest tightness, shortness of breath and  wheezing.   Cardiovascular: Negative for chest pain, palpitations and leg swelling.   Gastrointestinal: Positive for nausea, vomiting, abdominal pain and constipation. Negative for diarrhea, blood in stool and abdominal distention.   Genitourinary: Positive for vaginal discharge and pelvic pain. Negative for dysuria, urgency, frequency, hematuria, flank pain, decreased urine volume, vaginal bleeding, difficulty urinating, genital sores, vaginal pain, menstrual problem and dyspareunia.   Musculoskeletal: Positive for back pain. Negative for myalgias and joint swelling.   Skin: Negative for rash and wound.   Neurological: Positive for headaches. Negative for dizziness, tremors, syncope, speech difficulty, weakness, light-headedness and numbness.   Hematological: Negative for adenopathy. Does not bruise/bleed easily.   Psychiatric/Behavioral: Negative for suicidal ideas, hallucinations, confusion, sleep disturbance, dysphoric mood, decreased concentration and agitation. The patient is not nervous/anxious and is not hyperactive.       Exam normal    Review prenatal profile  Repeat beta-2 glycoprotein and KATHI testing  Continue with prenatal vitamins and baby aspirin  Add Prometrium 400 mg daily per M's recommendations  Discussed [id number].  Does not want to know fetal gender at this time.  Discussed NST at 32 weeks   Delivery at 39 weeks    Discussed Connected MOM.    Program explained with risks and benefits.  Patient opted in.  Orders in.  Equipments will be sent to her along with specific instructions.    Back in 4 weeks.

## 2022-08-28 ENCOUNTER — PATIENT MESSAGE (OUTPATIENT)
Dept: ADMINISTRATIVE | Facility: OTHER | Age: 47
End: 2022-08-28
Payer: COMMERCIAL

## 2022-08-31 ENCOUNTER — LAB VISIT (OUTPATIENT)
Dept: LAB | Facility: HOSPITAL | Age: 47
End: 2022-08-31
Attending: OBSTETRICS & GYNECOLOGY
Payer: COMMERCIAL

## 2022-08-31 DIAGNOSIS — O09.521 MULTIGRAVIDA OF ADVANCED MATERNAL AGE IN FIRST TRIMESTER: ICD-10-CM

## 2022-08-31 DIAGNOSIS — Z3A.12 12 WEEKS GESTATION OF PREGNANCY: ICD-10-CM

## 2022-08-31 DIAGNOSIS — O09.299 HISTORY OF SPONTANEOUS ABORTION, CURRENTLY PREGNANT: ICD-10-CM

## 2022-08-31 PROCEDURE — 86147 CARDIOLIPIN ANTIBODY EA IG: CPT | Mod: 59 | Performed by: OBSTETRICS & GYNECOLOGY

## 2022-08-31 PROCEDURE — 36415 COLL VENOUS BLD VENIPUNCTURE: CPT | Performed by: OBSTETRICS & GYNECOLOGY

## 2022-08-31 PROCEDURE — 86146 BETA-2 GLYCOPROTEIN ANTIBODY: CPT | Mod: 59 | Performed by: OBSTETRICS & GYNECOLOGY

## 2022-09-01 ENCOUNTER — PATIENT MESSAGE (OUTPATIENT)
Dept: MATERNAL FETAL MEDICINE | Facility: CLINIC | Age: 47
End: 2022-09-01
Payer: COMMERCIAL

## 2022-09-03 LAB
B2 GLYCOPROT1 IGA SER QL: <9 SAU
B2 GLYCOPROT1 IGG SER QL: <9 SGU
B2 GLYCOPROT1 IGM SER QL: <9 SMU

## 2022-09-06 LAB
CARDIOLIPIN IGG SER IA-ACNC: <9.4 GPL (ref 0–14.99)
CARDIOLIPIN IGM SER IA-ACNC: 13 MPL (ref 0–12.49)

## 2022-09-22 ENCOUNTER — LAB VISIT (OUTPATIENT)
Dept: LAB | Facility: HOSPITAL | Age: 47
End: 2022-09-22
Attending: OBSTETRICS & GYNECOLOGY
Payer: COMMERCIAL

## 2022-09-22 ENCOUNTER — ROUTINE PRENATAL (OUTPATIENT)
Dept: OBSTETRICS AND GYNECOLOGY | Facility: CLINIC | Age: 47
End: 2022-09-22
Payer: COMMERCIAL

## 2022-09-22 VITALS
WEIGHT: 167.56 LBS | DIASTOLIC BLOOD PRESSURE: 64 MMHG | BODY MASS INDEX: 27.88 KG/M2 | SYSTOLIC BLOOD PRESSURE: 112 MMHG

## 2022-09-22 DIAGNOSIS — O09.299 HISTORY OF SPONTANEOUS ABORTION, CURRENTLY PREGNANT: ICD-10-CM

## 2022-09-22 DIAGNOSIS — O09.521 MULTIGRAVIDA OF ADVANCED MATERNAL AGE IN FIRST TRIMESTER: ICD-10-CM

## 2022-09-22 DIAGNOSIS — O26.22 HISTORY OF RECURRENT ABORTION, CURRENTLY PREGNANT IN SECOND TRIMESTER: ICD-10-CM

## 2022-09-22 DIAGNOSIS — Z3A.16 16 WEEKS GESTATION OF PREGNANCY: Primary | ICD-10-CM

## 2022-09-22 DIAGNOSIS — Z3A.16 16 WEEKS GESTATION OF PREGNANCY: ICD-10-CM

## 2022-09-22 LAB
ABO + RH BLD: NORMAL
ALBUMIN SERPL BCP-MCNC: 3.1 G/DL (ref 3.5–5.2)
ALP SERPL-CCNC: 39 U/L (ref 55–135)
ALT SERPL W/O P-5'-P-CCNC: 10 U/L (ref 10–44)
ANION GAP SERPL CALC-SCNC: 5 MMOL/L (ref 8–16)
AST SERPL-CCNC: 12 U/L (ref 10–40)
BASOPHILS # BLD AUTO: 0.05 K/UL (ref 0–0.2)
BASOPHILS NFR BLD: 0.7 % (ref 0–1.9)
BILIRUB SERPL-MCNC: 0.2 MG/DL (ref 0.1–1)
BLD GP AB SCN CELLS X3 SERPL QL: NORMAL
BUN SERPL-MCNC: 7 MG/DL (ref 6–20)
CALCIUM SERPL-MCNC: 9.2 MG/DL (ref 8.7–10.5)
CHLORIDE SERPL-SCNC: 107 MMOL/L (ref 95–110)
CO2 SERPL-SCNC: 23 MMOL/L (ref 23–29)
CREAT SERPL-MCNC: 0.7 MG/DL (ref 0.5–1.4)
DIFFERENTIAL METHOD: ABNORMAL
EOSINOPHIL # BLD AUTO: 0.1 K/UL (ref 0–0.5)
EOSINOPHIL NFR BLD: 1.4 % (ref 0–8)
ERYTHROCYTE [DISTWIDTH] IN BLOOD BY AUTOMATED COUNT: 12.5 % (ref 11.5–14.5)
EST. GFR  (NO RACE VARIABLE): >60 ML/MIN/1.73 M^2
ESTIMATED AVG GLUCOSE: 111 MG/DL (ref 68–131)
GLUCOSE SERPL-MCNC: 95 MG/DL (ref 70–110)
HBA1C MFR BLD: 5.5 % (ref 4–5.6)
HBV SURFACE AG SERPL QL IA: NORMAL
HCT VFR BLD AUTO: 32.8 % (ref 37–48.5)
HCV AB SERPL QL IA: NORMAL
HGB BLD-MCNC: 10.9 G/DL (ref 12–16)
HIV 1+2 AB+HIV1 P24 AG SERPL QL IA: NORMAL
IMM GRANULOCYTES # BLD AUTO: 0.09 K/UL (ref 0–0.04)
IMM GRANULOCYTES NFR BLD AUTO: 1.3 % (ref 0–0.5)
LYMPHOCYTES # BLD AUTO: 1.1 K/UL (ref 1–4.8)
LYMPHOCYTES NFR BLD: 15.3 % (ref 18–48)
MCH RBC QN AUTO: 27.4 PG (ref 27–31)
MCHC RBC AUTO-ENTMCNC: 33.2 G/DL (ref 32–36)
MCV RBC AUTO: 82 FL (ref 82–98)
MONOCYTES # BLD AUTO: 0.6 K/UL (ref 0.3–1)
MONOCYTES NFR BLD: 7.7 % (ref 4–15)
NEUTROPHILS # BLD AUTO: 5.2 K/UL (ref 1.8–7.7)
NEUTROPHILS NFR BLD: 73.6 % (ref 38–73)
NRBC BLD-RTO: 0 /100 WBC
PLATELET # BLD AUTO: 272 K/UL (ref 150–450)
PMV BLD AUTO: 10 FL (ref 9.2–12.9)
POTASSIUM SERPL-SCNC: 4.2 MMOL/L (ref 3.5–5.1)
PROT SERPL-MCNC: 6.9 G/DL (ref 6–8.4)
RBC # BLD AUTO: 3.98 M/UL (ref 4–5.4)
SODIUM SERPL-SCNC: 135 MMOL/L (ref 136–145)
WBC # BLD AUTO: 7.11 K/UL (ref 3.9–12.7)

## 2022-09-22 PROCEDURE — 36415 COLL VENOUS BLD VENIPUNCTURE: CPT | Performed by: OBSTETRICS & GYNECOLOGY

## 2022-09-22 PROCEDURE — 85025 COMPLETE CBC W/AUTO DIFF WBC: CPT | Performed by: OBSTETRICS & GYNECOLOGY

## 2022-09-22 PROCEDURE — 99999 PR PBB SHADOW E&M-EST. PATIENT-LVL III: CPT | Mod: PBBFAC,,, | Performed by: OBSTETRICS & GYNECOLOGY

## 2022-09-22 PROCEDURE — 82105 ALPHA-FETOPROTEIN SERUM: CPT | Performed by: OBSTETRICS & GYNECOLOGY

## 2022-09-22 PROCEDURE — 86803 HEPATITIS C AB TEST: CPT | Performed by: OBSTETRICS & GYNECOLOGY

## 2022-09-22 PROCEDURE — 87340 HEPATITIS B SURFACE AG IA: CPT | Performed by: OBSTETRICS & GYNECOLOGY

## 2022-09-22 PROCEDURE — 0502F SUBSEQUENT PRENATAL CARE: CPT | Mod: CPTII,S$GLB,, | Performed by: OBSTETRICS & GYNECOLOGY

## 2022-09-22 PROCEDURE — 87389 HIV-1 AG W/HIV-1&-2 AB AG IA: CPT | Performed by: OBSTETRICS & GYNECOLOGY

## 2022-09-22 PROCEDURE — 80053 COMPREHEN METABOLIC PANEL: CPT | Performed by: OBSTETRICS & GYNECOLOGY

## 2022-09-22 PROCEDURE — 99999 PR PBB SHADOW E&M-EST. PATIENT-LVL III: ICD-10-PCS | Mod: PBBFAC,,, | Performed by: OBSTETRICS & GYNECOLOGY

## 2022-09-22 PROCEDURE — 86850 RBC ANTIBODY SCREEN: CPT | Performed by: OBSTETRICS & GYNECOLOGY

## 2022-09-22 PROCEDURE — 86592 SYPHILIS TEST NON-TREP QUAL: CPT | Performed by: OBSTETRICS & GYNECOLOGY

## 2022-09-22 PROCEDURE — 83036 HEMOGLOBIN GLYCOSYLATED A1C: CPT | Performed by: OBSTETRICS & GYNECOLOGY

## 2022-09-22 PROCEDURE — 0502F PR SUBSEQUENT PRENATAL CARE: ICD-10-PCS | Mod: CPTII,S$GLB,, | Performed by: OBSTETRICS & GYNECOLOGY

## 2022-09-22 PROCEDURE — 83020 HEMOGLOBIN ELECTROPHORESIS: CPT | Mod: 91 | Performed by: OBSTETRICS & GYNECOLOGY

## 2022-09-22 PROCEDURE — 86762 RUBELLA ANTIBODY: CPT | Performed by: OBSTETRICS & GYNECOLOGY

## 2022-09-22 NOTE — PROGRESS NOTES
16w5d  Patient comes in today for follow-up prenatal care  No new complaint.  No vaginal bleeding, contractions, or rupture of membranes.  Good fetal movements.    Eating well without significant nausea/vomiting  Gaining weight   Taking prenatal vitamins, and baby aspirin  Taking Prometrium 200 mg nightly    Doing well with Connected MOM    Discussed with patient MFM's findings and recommendations  Discussed her history of recurrent Abs.  Discussed work-up  Discussed [id number]  AFP today with prenatal profile  Discussed NST at 32 weeks and delivery at 39 weeks    Back in 4 weeks

## 2022-09-23 LAB
RPR SER QL: NORMAL
RUBV IGG SER-ACNC: 19.9 IU/ML
RUBV IGG SER-IMP: REACTIVE

## 2022-09-25 LAB
HB ELECTROPHORESIS INTERP CANCEL: NORMAL
HB ELECTROPHORESIS INTERPRETATION: NORMAL
HGB A MFR BLD ELPH: 97.6 % (ref 95.8–98)
HGB A2 MFR BLD: 2.4 % (ref 2–3.3)
HGB A2+XXX MFR BLD ELPH: NORMAL %
HGB F MFR BLD: 0 % (ref 0–0.9)
HGB XXX MFR BLD ELPH: NORMAL %
HPLC HB VARIANT: NORMAL

## 2022-09-26 LAB
# FETUSES US: NORMAL
AFP INTERPRETATION: NORMAL
AFP MOM SERPL: 0.92
AFP SERPL-MCNC: 37.3 NG/ML
AFP SERPL-MCNC: NEGATIVE NG/ML
AGE AT DELIVERY: 45
GA (DAYS): 5 D
GA (WEEKS): 16 WK
GESTATIONAL AGE METHOD: NORMAL
IDDM PATIENT QL: NORMAL
SMOKING STATUS FTND: NO

## 2022-10-10 ENCOUNTER — PATIENT MESSAGE (OUTPATIENT)
Dept: MATERNAL FETAL MEDICINE | Facility: CLINIC | Age: 47
End: 2022-10-10
Payer: COMMERCIAL

## 2022-10-11 ENCOUNTER — PROCEDURE VISIT (OUTPATIENT)
Dept: MATERNAL FETAL MEDICINE | Facility: CLINIC | Age: 47
End: 2022-10-11
Payer: COMMERCIAL

## 2022-10-11 DIAGNOSIS — Z36.89 ENCOUNTER FOR FETAL ANATOMIC SURVEY: ICD-10-CM

## 2022-10-11 DIAGNOSIS — Z36.2 ENCOUNTER FOR FOLLOW-UP ULTRASOUND OF FETAL ANATOMY: Primary | ICD-10-CM

## 2022-10-11 PROCEDURE — 76811 US MFM PROCEDURE (VIEWPOINT): ICD-10-PCS | Mod: S$GLB,,, | Performed by: OBSTETRICS & GYNECOLOGY

## 2022-10-11 PROCEDURE — 76811 OB US DETAILED SNGL FETUS: CPT | Mod: S$GLB,,, | Performed by: OBSTETRICS & GYNECOLOGY

## 2022-10-20 ENCOUNTER — ROUTINE PRENATAL (OUTPATIENT)
Dept: OBSTETRICS AND GYNECOLOGY | Facility: CLINIC | Age: 47
End: 2022-10-20
Payer: COMMERCIAL

## 2022-10-20 VITALS
WEIGHT: 170.88 LBS | BODY MASS INDEX: 28.43 KG/M2 | DIASTOLIC BLOOD PRESSURE: 78 MMHG | SYSTOLIC BLOOD PRESSURE: 116 MMHG

## 2022-10-20 DIAGNOSIS — O26.22 HISTORY OF RECURRENT ABORTION, CURRENTLY PREGNANT IN SECOND TRIMESTER: ICD-10-CM

## 2022-10-20 DIAGNOSIS — O09.521 MULTIGRAVIDA OF ADVANCED MATERNAL AGE IN FIRST TRIMESTER: ICD-10-CM

## 2022-10-20 DIAGNOSIS — Z3A.20 20 WEEKS GESTATION OF PREGNANCY: Primary | ICD-10-CM

## 2022-10-20 DIAGNOSIS — O09.299 HISTORY OF SPONTANEOUS ABORTION, CURRENTLY PREGNANT: ICD-10-CM

## 2022-10-20 PROCEDURE — 0502F SUBSEQUENT PRENATAL CARE: CPT | Mod: CPTII,S$GLB,, | Performed by: OBSTETRICS & GYNECOLOGY

## 2022-10-20 PROCEDURE — 99999 PR PBB SHADOW E&M-EST. PATIENT-LVL III: ICD-10-PCS | Mod: PBBFAC,,, | Performed by: OBSTETRICS & GYNECOLOGY

## 2022-10-20 PROCEDURE — 99999 PR PBB SHADOW E&M-EST. PATIENT-LVL III: CPT | Mod: PBBFAC,,, | Performed by: OBSTETRICS & GYNECOLOGY

## 2022-10-20 PROCEDURE — 0502F PR SUBSEQUENT PRENATAL CARE: ICD-10-PCS | Mod: CPTII,S$GLB,, | Performed by: OBSTETRICS & GYNECOLOGY

## 2022-10-20 NOTE — PROGRESS NOTES
16w5d  Patient comes in today for follow-up prenatal care  No new complaint.  No vaginal bleeding, contractions, or rupture of membranes.  Good fetal movements.    Eating well without significant nausea/vomiting  Gaining weight   Taking prenatal vitamins, iron supplement, and baby aspirin  Taking Prometrium 200 mg nightly    Doing well with Connected MOM    Discussed with patient MFM's findings and recommendations  Discussed her history of recurrent Abs.  Discussed work-up  Discussed [id number]. Normal XX  AFP normal  Discussed NST at 32 weeks and delivery at 39 weeks    Back in 4 weeks

## 2022-11-17 ENCOUNTER — ROUTINE PRENATAL (OUTPATIENT)
Dept: OBSTETRICS AND GYNECOLOGY | Facility: CLINIC | Age: 47
End: 2022-11-17
Payer: COMMERCIAL

## 2022-11-17 VITALS — BODY MASS INDEX: 28.54 KG/M2 | DIASTOLIC BLOOD PRESSURE: 66 MMHG | SYSTOLIC BLOOD PRESSURE: 112 MMHG | WEIGHT: 171.5 LBS

## 2022-11-17 DIAGNOSIS — O26.22 HISTORY OF RECURRENT ABORTION, CURRENTLY PREGNANT IN SECOND TRIMESTER: ICD-10-CM

## 2022-11-17 DIAGNOSIS — O09.299 HISTORY OF SPONTANEOUS ABORTION, CURRENTLY PREGNANT: ICD-10-CM

## 2022-11-17 DIAGNOSIS — Z3A.24 24 WEEKS GESTATION OF PREGNANCY: Primary | ICD-10-CM

## 2022-11-17 DIAGNOSIS — O09.522 MULTIGRAVIDA OF ADVANCED MATERNAL AGE IN SECOND TRIMESTER: ICD-10-CM

## 2022-11-17 PROCEDURE — 99999 PR PBB SHADOW E&M-EST. PATIENT-LVL III: CPT | Mod: PBBFAC,,, | Performed by: OBSTETRICS & GYNECOLOGY

## 2022-11-17 PROCEDURE — 0502F SUBSEQUENT PRENATAL CARE: CPT | Mod: CPTII,S$GLB,, | Performed by: OBSTETRICS & GYNECOLOGY

## 2022-11-17 PROCEDURE — 0502F PR SUBSEQUENT PRENATAL CARE: ICD-10-PCS | Mod: CPTII,S$GLB,, | Performed by: OBSTETRICS & GYNECOLOGY

## 2022-11-17 PROCEDURE — 99999 PR PBB SHADOW E&M-EST. PATIENT-LVL III: ICD-10-PCS | Mod: PBBFAC,,, | Performed by: OBSTETRICS & GYNECOLOGY

## 2022-11-17 NOTE — PROGRESS NOTES
24w5d  Patient comes in today for follow-up prenatal care  No new complaint.  No vaginal bleeding, contractions, or rupture of membranes.  Good fetal movements.    Eating well without significant nausea/vomiting  Gaining weight   Taking prenatal vitamins, iron supplement, and baby aspirin  Taking Prometrium 200 mg nightly    Doing well with Connected MOM    Discussed with patient MFM's findings and recommendations  Discussed her history of recurrent Abs and progesterone supplement  Discussed work-up  Discussed [id number]. Normal XX  AFP normal  Discussed NST at 32 weeks and delivery at 39 weeks  Glucose tolerance test soon.  Back in 4 weeks

## 2022-11-18 ENCOUNTER — PATIENT MESSAGE (OUTPATIENT)
Dept: MATERNAL FETAL MEDICINE | Facility: CLINIC | Age: 47
End: 2022-11-18
Payer: COMMERCIAL

## 2022-11-22 ENCOUNTER — PROCEDURE VISIT (OUTPATIENT)
Dept: MATERNAL FETAL MEDICINE | Facility: CLINIC | Age: 47
End: 2022-11-22
Payer: COMMERCIAL

## 2022-11-22 DIAGNOSIS — O09.521 MULTIGRAVIDA OF ADVANCED MATERNAL AGE IN FIRST TRIMESTER: ICD-10-CM

## 2022-11-22 DIAGNOSIS — Z36.2 ENCOUNTER FOR FOLLOW-UP ULTRASOUND OF FETAL ANATOMY: ICD-10-CM

## 2022-11-22 DIAGNOSIS — Z36.89 ENCOUNTER FOR ULTRASOUND TO ASSESS FETAL GROWTH: Primary | ICD-10-CM

## 2022-11-22 PROCEDURE — 76816 OB US FOLLOW-UP PER FETUS: CPT | Mod: S$GLB,,, | Performed by: OBSTETRICS & GYNECOLOGY

## 2022-11-22 PROCEDURE — 76816 US MFM PROCEDURE (VIEWPOINT): ICD-10-PCS | Mod: S$GLB,,, | Performed by: OBSTETRICS & GYNECOLOGY

## 2022-11-22 NOTE — NURSING
Patient laying flat during ultrasound and began to feel hot and dizzy. Ultrasound tech called nurse and MA to bedside. Patient turned to her left side, water given. Patient began to feel better. Blood pressure was 83/60. Ultrasound completed and repeat blood pressure taken by MA was 112/62. Dr. Briscoe notified by MA.

## 2022-12-07 ENCOUNTER — PATIENT MESSAGE (OUTPATIENT)
Dept: MATERNAL FETAL MEDICINE | Facility: CLINIC | Age: 47
End: 2022-12-07
Payer: COMMERCIAL

## 2022-12-07 ENCOUNTER — LAB VISIT (OUTPATIENT)
Dept: LAB | Facility: HOSPITAL | Age: 47
End: 2022-12-07
Attending: OBSTETRICS & GYNECOLOGY
Payer: COMMERCIAL

## 2022-12-07 DIAGNOSIS — Z3A.24 24 WEEKS GESTATION OF PREGNANCY: ICD-10-CM

## 2022-12-07 LAB
BASOPHILS # BLD AUTO: 0.05 K/UL (ref 0–0.2)
BASOPHILS NFR BLD: 0.7 % (ref 0–1.9)
DIFFERENTIAL METHOD: ABNORMAL
EOSINOPHIL # BLD AUTO: 0.1 K/UL (ref 0–0.5)
EOSINOPHIL NFR BLD: 1.3 % (ref 0–8)
ERYTHROCYTE [DISTWIDTH] IN BLOOD BY AUTOMATED COUNT: 12.5 % (ref 11.5–14.5)
GLUCOSE SERPL-MCNC: 95 MG/DL (ref 70–140)
HCT VFR BLD AUTO: 31.8 % (ref 37–48.5)
HGB BLD-MCNC: 10.2 G/DL (ref 12–16)
IMM GRANULOCYTES # BLD AUTO: 0.2 K/UL (ref 0–0.04)
IMM GRANULOCYTES NFR BLD AUTO: 2.8 % (ref 0–0.5)
LYMPHOCYTES # BLD AUTO: 1.3 K/UL (ref 1–4.8)
LYMPHOCYTES NFR BLD: 18.7 % (ref 18–48)
MCH RBC QN AUTO: 27.1 PG (ref 27–31)
MCHC RBC AUTO-ENTMCNC: 32.1 G/DL (ref 32–36)
MCV RBC AUTO: 85 FL (ref 82–98)
MONOCYTES # BLD AUTO: 0.5 K/UL (ref 0.3–1)
MONOCYTES NFR BLD: 7.2 % (ref 4–15)
NEUTROPHILS # BLD AUTO: 4.9 K/UL (ref 1.8–7.7)
NEUTROPHILS NFR BLD: 69.3 % (ref 38–73)
NRBC BLD-RTO: 0 /100 WBC
PLATELET # BLD AUTO: 264 K/UL (ref 150–450)
PMV BLD AUTO: 10.1 FL (ref 9.2–12.9)
RBC # BLD AUTO: 3.76 M/UL (ref 4–5.4)
WBC # BLD AUTO: 7.05 K/UL (ref 3.9–12.7)

## 2022-12-07 PROCEDURE — 36415 COLL VENOUS BLD VENIPUNCTURE: CPT | Performed by: OBSTETRICS & GYNECOLOGY

## 2022-12-07 PROCEDURE — 85025 COMPLETE CBC W/AUTO DIFF WBC: CPT | Performed by: OBSTETRICS & GYNECOLOGY

## 2022-12-07 PROCEDURE — 82950 GLUCOSE TEST: CPT | Performed by: OBSTETRICS & GYNECOLOGY

## 2022-12-15 ENCOUNTER — CLINICAL SUPPORT (OUTPATIENT)
Dept: OBSTETRICS AND GYNECOLOGY | Facility: CLINIC | Age: 47
End: 2022-12-15
Payer: COMMERCIAL

## 2022-12-15 ENCOUNTER — ROUTINE PRENATAL (OUTPATIENT)
Dept: OBSTETRICS AND GYNECOLOGY | Facility: CLINIC | Age: 47
End: 2022-12-15
Payer: COMMERCIAL

## 2022-12-15 VITALS
BODY MASS INDEX: 28.21 KG/M2 | SYSTOLIC BLOOD PRESSURE: 114 MMHG | WEIGHT: 169.56 LBS | DIASTOLIC BLOOD PRESSURE: 64 MMHG

## 2022-12-15 DIAGNOSIS — O09.522 MULTIGRAVIDA OF ADVANCED MATERNAL AGE IN SECOND TRIMESTER: ICD-10-CM

## 2022-12-15 DIAGNOSIS — Z23 NEED FOR TDAP VACCINATION: Primary | ICD-10-CM

## 2022-12-15 DIAGNOSIS — Z3A.28 28 WEEKS GESTATION OF PREGNANCY: Primary | ICD-10-CM

## 2022-12-15 DIAGNOSIS — O09.299 HISTORY OF SPONTANEOUS ABORTION, CURRENTLY PREGNANT: ICD-10-CM

## 2022-12-15 PROCEDURE — 90471 IMMUNIZATION ADMIN: CPT | Mod: S$GLB,,, | Performed by: OBSTETRICS & GYNECOLOGY

## 2022-12-15 PROCEDURE — 99999 PR PBB SHADOW E&M-EST. PATIENT-LVL III: CPT | Mod: PBBFAC,,, | Performed by: OBSTETRICS & GYNECOLOGY

## 2022-12-15 PROCEDURE — 99999 PR PBB SHADOW E&M-EST. PATIENT-LVL III: ICD-10-PCS | Mod: PBBFAC,,, | Performed by: OBSTETRICS & GYNECOLOGY

## 2022-12-15 PROCEDURE — 90715 TDAP VACCINE 7 YRS/> IM: CPT | Mod: S$GLB,,, | Performed by: OBSTETRICS & GYNECOLOGY

## 2022-12-15 PROCEDURE — 0502F PR SUBSEQUENT PRENATAL CARE: ICD-10-PCS | Mod: CPTII,S$GLB,, | Performed by: OBSTETRICS & GYNECOLOGY

## 2022-12-15 PROCEDURE — 90715 TDAP VACCINE GREATER THAN OR EQUAL TO 7YO IM: ICD-10-PCS | Mod: S$GLB,,, | Performed by: OBSTETRICS & GYNECOLOGY

## 2022-12-15 PROCEDURE — 99999 PR PBB SHADOW E&M-EST. PATIENT-LVL I: CPT | Mod: PBBFAC,,,

## 2022-12-15 PROCEDURE — 90471 TDAP VACCINE GREATER THAN OR EQUAL TO 7YO IM: ICD-10-PCS | Mod: S$GLB,,, | Performed by: OBSTETRICS & GYNECOLOGY

## 2022-12-15 PROCEDURE — 0502F SUBSEQUENT PRENATAL CARE: CPT | Mod: CPTII,S$GLB,, | Performed by: OBSTETRICS & GYNECOLOGY

## 2022-12-15 PROCEDURE — 99999 PR PBB SHADOW E&M-EST. PATIENT-LVL I: ICD-10-PCS | Mod: PBBFAC,,,

## 2022-12-15 NOTE — PROGRESS NOTES
28w5d  Patient comes in today for follow-up prenatal care  No new complaint.  No vaginal bleeding, contractions, or rupture of membranes.  Good fetal movements.    Eating well without significant nausea/vomiting  Gaining weight   Taking prenatal vitamins, iron supplement, and baby aspirin  Taking Prometrium 200 mg nightly    Doing well with Connected MOM    Glucose tolerance test normal    Discussed with patient MFM's findings and recommendations  Discussed her history of recurrent Abs and progesterone supplement  Discussed work-up  Discussed [id number]. Normal XX  AFP normal  Discussed NST at 32 weeks and delivery at 39 weeks for AMA >40    Back in 2 weeks

## 2022-12-29 ENCOUNTER — ROUTINE PRENATAL (OUTPATIENT)
Dept: OBSTETRICS AND GYNECOLOGY | Facility: CLINIC | Age: 47
End: 2022-12-29
Payer: COMMERCIAL

## 2022-12-29 VITALS
BODY MASS INDEX: 28.21 KG/M2 | DIASTOLIC BLOOD PRESSURE: 66 MMHG | SYSTOLIC BLOOD PRESSURE: 118 MMHG | WEIGHT: 169.56 LBS

## 2022-12-29 DIAGNOSIS — O09.299 HISTORY OF SPONTANEOUS ABORTION, CURRENTLY PREGNANT: ICD-10-CM

## 2022-12-29 DIAGNOSIS — O09.523 MULTIGRAVIDA OF ADVANCED MATERNAL AGE IN THIRD TRIMESTER: ICD-10-CM

## 2022-12-29 DIAGNOSIS — Z3A.30 30 WEEKS GESTATION OF PREGNANCY: Primary | ICD-10-CM

## 2022-12-29 PROCEDURE — 0502F SUBSEQUENT PRENATAL CARE: CPT | Mod: CPTII,S$GLB,, | Performed by: OBSTETRICS & GYNECOLOGY

## 2022-12-29 PROCEDURE — 0502F PR SUBSEQUENT PRENATAL CARE: ICD-10-PCS | Mod: CPTII,S$GLB,, | Performed by: OBSTETRICS & GYNECOLOGY

## 2022-12-29 PROCEDURE — 99999 PR PBB SHADOW E&M-EST. PATIENT-LVL III: CPT | Mod: PBBFAC,,, | Performed by: OBSTETRICS & GYNECOLOGY

## 2022-12-29 PROCEDURE — 99999 PR PBB SHADOW E&M-EST. PATIENT-LVL III: ICD-10-PCS | Mod: PBBFAC,,, | Performed by: OBSTETRICS & GYNECOLOGY

## 2022-12-29 NOTE — PROGRESS NOTES
30w5d  Patient comes in today for follow-up prenatal care  No new complaint.  No vaginal bleeding, contractions, or rupture of membranes.  Good fetal movements.    Eating well without significant nausea/vomiting  Gaining weight   Taking prenatal vitamins, iron supplement, and baby aspirin  Taking Prometrium 200 mg nightly    Doing well with Connected MOM    Glucose tolerance test normal    Discussed with patient MFM's findings and recommendations  Discussed her history of recurrent Abs and progesterone supplement  Discussed work-up  Discussed [id number]. Normal XX  AFP normal  Discussed NST at 32 weeks and delivery at 39 weeks for AMA >40  BPP with MFM on 1/12/2023    Back in 2 weeks

## 2023-01-06 ENCOUNTER — PATIENT MESSAGE (OUTPATIENT)
Dept: OBSTETRICS AND GYNECOLOGY | Facility: CLINIC | Age: 48
End: 2023-01-06
Payer: COMMERCIAL

## 2023-01-07 ENCOUNTER — PATIENT MESSAGE (OUTPATIENT)
Dept: OTHER | Facility: OTHER | Age: 48
End: 2023-01-07
Payer: COMMERCIAL

## 2023-01-11 ENCOUNTER — PATIENT MESSAGE (OUTPATIENT)
Dept: MATERNAL FETAL MEDICINE | Facility: CLINIC | Age: 48
End: 2023-01-11
Payer: COMMERCIAL

## 2023-01-12 ENCOUNTER — PROCEDURE VISIT (OUTPATIENT)
Dept: MATERNAL FETAL MEDICINE | Facility: CLINIC | Age: 48
End: 2023-01-12
Payer: COMMERCIAL

## 2023-01-12 ENCOUNTER — ROUTINE PRENATAL (OUTPATIENT)
Dept: OBSTETRICS AND GYNECOLOGY | Facility: CLINIC | Age: 48
End: 2023-01-12
Payer: COMMERCIAL

## 2023-01-12 VITALS
DIASTOLIC BLOOD PRESSURE: 60 MMHG | WEIGHT: 169.75 LBS | SYSTOLIC BLOOD PRESSURE: 112 MMHG | BODY MASS INDEX: 28.25 KG/M2

## 2023-01-12 DIAGNOSIS — Z3A.32 32 WEEKS GESTATION OF PREGNANCY: Primary | ICD-10-CM

## 2023-01-12 DIAGNOSIS — O09.523 MULTIGRAVIDA OF ADVANCED MATERNAL AGE IN THIRD TRIMESTER: ICD-10-CM

## 2023-01-12 DIAGNOSIS — O09.299 HISTORY OF SPONTANEOUS ABORTION, CURRENTLY PREGNANT: ICD-10-CM

## 2023-01-12 DIAGNOSIS — O09.521 MULTIGRAVIDA OF ADVANCED MATERNAL AGE IN FIRST TRIMESTER: ICD-10-CM

## 2023-01-12 DIAGNOSIS — Z36.89 ENCOUNTER FOR ULTRASOUND TO ASSESS FETAL GROWTH: ICD-10-CM

## 2023-01-12 PROCEDURE — 0502F SUBSEQUENT PRENATAL CARE: CPT | Mod: CPTII,S$GLB,, | Performed by: OBSTETRICS & GYNECOLOGY

## 2023-01-12 PROCEDURE — 0502F PR SUBSEQUENT PRENATAL CARE: ICD-10-PCS | Mod: CPTII,S$GLB,, | Performed by: OBSTETRICS & GYNECOLOGY

## 2023-01-12 PROCEDURE — 76819 FETAL BIOPHYS PROFIL W/O NST: CPT | Mod: S$GLB,,, | Performed by: OBSTETRICS & GYNECOLOGY

## 2023-01-12 PROCEDURE — 99999 PR PBB SHADOW E&M-EST. PATIENT-LVL III: ICD-10-PCS | Mod: PBBFAC,,, | Performed by: OBSTETRICS & GYNECOLOGY

## 2023-01-12 PROCEDURE — 76816 US MFM PROCEDURE (VIEWPOINT): ICD-10-PCS | Mod: S$GLB,,, | Performed by: OBSTETRICS & GYNECOLOGY

## 2023-01-12 PROCEDURE — 99999 PR PBB SHADOW E&M-EST. PATIENT-LVL III: CPT | Mod: PBBFAC,,, | Performed by: OBSTETRICS & GYNECOLOGY

## 2023-01-12 PROCEDURE — 76819 US MFM PROCEDURE (VIEWPOINT): ICD-10-PCS | Mod: S$GLB,,, | Performed by: OBSTETRICS & GYNECOLOGY

## 2023-01-12 PROCEDURE — 76816 OB US FOLLOW-UP PER FETUS: CPT | Mod: S$GLB,,, | Performed by: OBSTETRICS & GYNECOLOGY

## 2023-01-12 NOTE — PROGRESS NOTES
32w5d  Patient comes in today for follow-up prenatal care  No new complaint.  No vaginal bleeding, contractions, or rupture of membranes.  Good fetal movements.    Eating well without significant nausea/vomiting  Gaining weight   Taking prenatal vitamins, iron supplement, and baby aspirin  Taking Prometrium 200 mg nightly    Doing well with Connected MOM    Glucose tolerance test normal    Had her ultrasound today with MFM.    Discussed with patient MFM's findings and recommendations  Discussed her history of recurrent Abs and progesterone supplement  Discussed work-up  Discussed [id number]. Normal XX  AFP normal  Discussed NST at 32 weeks and delivery at 39 weeks for AMA >40  BPP with MFM today and it was normal    Back next week

## 2023-01-19 ENCOUNTER — CLINICAL SUPPORT (OUTPATIENT)
Dept: OBSTETRICS AND GYNECOLOGY | Facility: CLINIC | Age: 48
End: 2023-01-19
Payer: COMMERCIAL

## 2023-01-19 ENCOUNTER — ROUTINE PRENATAL (OUTPATIENT)
Dept: OBSTETRICS AND GYNECOLOGY | Facility: CLINIC | Age: 48
End: 2023-01-19
Payer: COMMERCIAL

## 2023-01-19 VITALS
SYSTOLIC BLOOD PRESSURE: 120 MMHG | DIASTOLIC BLOOD PRESSURE: 70 MMHG | WEIGHT: 167.56 LBS | BODY MASS INDEX: 27.88 KG/M2

## 2023-01-19 DIAGNOSIS — Z3A.33 33 WEEKS GESTATION OF PREGNANCY: Primary | ICD-10-CM

## 2023-01-19 DIAGNOSIS — O09.523 MULTIGRAVIDA OF ADVANCED MATERNAL AGE IN THIRD TRIMESTER: ICD-10-CM

## 2023-01-19 DIAGNOSIS — O09.299 HISTORY OF SPONTANEOUS ABORTION, CURRENTLY PREGNANT: ICD-10-CM

## 2023-01-19 PROCEDURE — 0502F SUBSEQUENT PRENATAL CARE: CPT | Mod: CPTII,S$GLB,, | Performed by: OBSTETRICS & GYNECOLOGY

## 2023-01-19 PROCEDURE — 59025 PR FETAL 2N-STRESS TEST: ICD-10-PCS | Mod: 26,,, | Performed by: OBSTETRICS & GYNECOLOGY

## 2023-01-19 PROCEDURE — 99999 PR PBB SHADOW E&M-EST. PATIENT-LVL III: ICD-10-PCS | Mod: PBBFAC,,, | Performed by: OBSTETRICS & GYNECOLOGY

## 2023-01-19 PROCEDURE — 0502F PR SUBSEQUENT PRENATAL CARE: ICD-10-PCS | Mod: CPTII,S$GLB,, | Performed by: OBSTETRICS & GYNECOLOGY

## 2023-01-19 PROCEDURE — 99999 PR PBB SHADOW E&M-EST. PATIENT-LVL III: CPT | Mod: PBBFAC,,, | Performed by: OBSTETRICS & GYNECOLOGY

## 2023-01-19 PROCEDURE — 59025 FETAL NON-STRESS TEST: CPT | Mod: 26,,, | Performed by: OBSTETRICS & GYNECOLOGY

## 2023-01-19 NOTE — PROGRESS NOTES
33w5d  Patient comes in today for follow-up prenatal care  No new complaint.  No vaginal bleeding, contractions, or rupture of membranes.  Good fetal movements.    Eating well without significant nausea/vomiting  Gaining weight   Taking prenatal vitamins, iron supplement, and baby aspirin  Taking Prometrium 200 mg nightly    Doing well with Connected MOM    Glucose tolerance test normal    Discussed with patient MFM's findings and recommendations  Discussed her history of recurrent Abs and progesterone supplement  Discussed work-up  Discussed [id number]. Normal XX  AFP normal  Discussed NST at 32 weeks and delivery at 39 weeks for AMA >40  BPP with MFM today and it was normal    Back next week    NST NOTES    Patient presents for her office visit and NST.    Indication:    Advanced maternal age at 47 with history of multiple spontaneous     Interpretation:    Fetal heart tones show a baseline of 140 with at least 15 bpm x 15 seconds accelerations and moderate variability.  This is reactive.  No decelerations noted.  Good fetal activities noted.    Time:    Patient was monitored for 34 minutes for this visit    Jose Briscoe MD

## 2023-01-26 ENCOUNTER — ROUTINE PRENATAL (OUTPATIENT)
Dept: OBSTETRICS AND GYNECOLOGY | Facility: CLINIC | Age: 48
End: 2023-01-26
Payer: COMMERCIAL

## 2023-01-26 ENCOUNTER — CLINICAL SUPPORT (OUTPATIENT)
Dept: OBSTETRICS AND GYNECOLOGY | Facility: CLINIC | Age: 48
End: 2023-01-26
Payer: COMMERCIAL

## 2023-01-26 VITALS — SYSTOLIC BLOOD PRESSURE: 120 MMHG | WEIGHT: 168.88 LBS | BODY MASS INDEX: 28.1 KG/M2 | DIASTOLIC BLOOD PRESSURE: 68 MMHG

## 2023-01-26 DIAGNOSIS — Z3A.34 34 WEEKS GESTATION OF PREGNANCY: Primary | ICD-10-CM

## 2023-01-26 DIAGNOSIS — O09.523 MULTIGRAVIDA OF ADVANCED MATERNAL AGE IN THIRD TRIMESTER: ICD-10-CM

## 2023-01-26 DIAGNOSIS — O09.299 HISTORY OF SPONTANEOUS ABORTION, CURRENTLY PREGNANT: ICD-10-CM

## 2023-01-26 PROCEDURE — 99999 PR PBB SHADOW E&M-EST. PATIENT-LVL III: CPT | Mod: PBBFAC,,, | Performed by: OBSTETRICS & GYNECOLOGY

## 2023-01-26 PROCEDURE — 0502F PR SUBSEQUENT PRENATAL CARE: ICD-10-PCS | Mod: CPTII,S$GLB,, | Performed by: OBSTETRICS & GYNECOLOGY

## 2023-01-26 PROCEDURE — 99999 PR PBB SHADOW E&M-EST. PATIENT-LVL III: ICD-10-PCS | Mod: PBBFAC,,, | Performed by: OBSTETRICS & GYNECOLOGY

## 2023-01-26 PROCEDURE — 59025 PR FETAL 2N-STRESS TEST: ICD-10-PCS | Mod: 26,,, | Performed by: OBSTETRICS & GYNECOLOGY

## 2023-01-26 PROCEDURE — 59025 FETAL NON-STRESS TEST: CPT | Mod: 26,,, | Performed by: OBSTETRICS & GYNECOLOGY

## 2023-01-26 PROCEDURE — 0502F SUBSEQUENT PRENATAL CARE: CPT | Mod: CPTII,S$GLB,, | Performed by: OBSTETRICS & GYNECOLOGY

## 2023-01-26 NOTE — PROGRESS NOTES
NST completed in prenatal testing clinic. NST reactive and reassuring. Patient denies cxtn, leaking of fluid or vaginal bleeding. Patient reports + fetal movement. Routine OB apt following NST.

## 2023-01-26 NOTE — PROGRESS NOTES
34w5d  Patient comes in today for follow-up prenatal care  No new complaint.  No vaginal bleeding, contractions, or rupture of membranes.  Good fetal movements.    Eating well without significant nausea/vomiting  Gaining weight   Taking prenatal vitamins, iron supplement, and baby aspirin  Taking Prometrium 200 mg nightly    Doing well with Connected MOM    Glucose tolerance test normal    Discussed with patient MFM's findings and recommendations  Discussed her history of recurrent Abs and progesterone supplement  Discussed work-up  Discussed [id number]. Normal XX  AFP normal  Discussed NST at 32 weeks and delivery at 39 weeks for AMA >40  BPP with MFM today and it was normal    Back next week    NST NOTES    Patient presents for her office visit and NST.    Indication:    Advanced maternal age at 47 with history of multiple spontaneous     Interpretation:    Fetal heart tones show a baseline of 130 with at least 15 bpm x 15 seconds accelerations and moderate variability.  This is reactive.  No decelerations noted.  Good fetal activities noted.    Time:    Patient was monitored for 21 minutes for this visit    Jose Briscoe MD

## 2023-01-26 NOTE — PROGRESS NOTES
NST NOTES    Patient presents for her office visit and NST.    Indication:    Advanced maternal age at 47 with history of multiple spontaneous     Interpretation:    Fetal heart tones show a baseline of 130 with at least 15 bpm x 15 seconds accelerations and moderate variability.  This is reactive.  No decelerations noted.  Good fetal activities noted.    Time:    Patient was monitored for 21 minutes for this visit    Jose Birscoe MD

## 2023-01-28 ENCOUNTER — PATIENT MESSAGE (OUTPATIENT)
Dept: OTHER | Facility: OTHER | Age: 48
End: 2023-01-28
Payer: COMMERCIAL

## 2023-02-02 ENCOUNTER — CLINICAL SUPPORT (OUTPATIENT)
Dept: OBSTETRICS AND GYNECOLOGY | Facility: CLINIC | Age: 48
End: 2023-02-02
Payer: COMMERCIAL

## 2023-02-02 ENCOUNTER — ROUTINE PRENATAL (OUTPATIENT)
Dept: OBSTETRICS AND GYNECOLOGY | Facility: CLINIC | Age: 48
End: 2023-02-02
Payer: COMMERCIAL

## 2023-02-02 VITALS
DIASTOLIC BLOOD PRESSURE: 70 MMHG | SYSTOLIC BLOOD PRESSURE: 110 MMHG | BODY MASS INDEX: 28.51 KG/M2 | WEIGHT: 171.31 LBS

## 2023-02-02 DIAGNOSIS — O09.299 HISTORY OF SPONTANEOUS ABORTION, CURRENTLY PREGNANT: ICD-10-CM

## 2023-02-02 DIAGNOSIS — O09.523 MULTIGRAVIDA OF ADVANCED MATERNAL AGE IN THIRD TRIMESTER: ICD-10-CM

## 2023-02-02 DIAGNOSIS — Z3A.35 35 WEEKS GESTATION OF PREGNANCY: Primary | ICD-10-CM

## 2023-02-02 PROCEDURE — 59025 FETAL NON-STRESS TEST: CPT | Mod: 26,,, | Performed by: OBSTETRICS & GYNECOLOGY

## 2023-02-02 PROCEDURE — 99999 PR PBB SHADOW E&M-EST. PATIENT-LVL III: ICD-10-PCS | Mod: PBBFAC,,, | Performed by: OBSTETRICS & GYNECOLOGY

## 2023-02-02 PROCEDURE — 0502F PR SUBSEQUENT PRENATAL CARE: ICD-10-PCS | Mod: CPTII,S$GLB,, | Performed by: OBSTETRICS & GYNECOLOGY

## 2023-02-02 PROCEDURE — 99999 PR PBB SHADOW E&M-EST. PATIENT-LVL III: CPT | Mod: PBBFAC,,, | Performed by: OBSTETRICS & GYNECOLOGY

## 2023-02-02 PROCEDURE — 59025 PR FETAL 2N-STRESS TEST: ICD-10-PCS | Mod: 26,,, | Performed by: OBSTETRICS & GYNECOLOGY

## 2023-02-02 PROCEDURE — 0502F SUBSEQUENT PRENATAL CARE: CPT | Mod: CPTII,S$GLB,, | Performed by: OBSTETRICS & GYNECOLOGY

## 2023-02-02 PROCEDURE — 59025 FETAL NON-STRESS TEST: CPT | Mod: PBBFAC | Performed by: OBSTETRICS & GYNECOLOGY

## 2023-02-02 PROCEDURE — 99213 OFFICE O/P EST LOW 20 MIN: CPT | Mod: PBBFAC,TH,25 | Performed by: OBSTETRICS & GYNECOLOGY

## 2023-02-02 NOTE — PROGRESS NOTES
NST NOTES    Patient presents for her office visit and NST.    Indication:    Advanced maternal age at 47 with history of multiple spontaneous     Interpretation:    Fetal heart tones show a baseline of 120 with at least 15 bpm x 15 seconds accelerations and moderate variability.  This is reactive.  No decelerations noted.  Good fetal activities noted.    Time:    Patient was monitored for 23 minutes for this visit    Jose Briscoe MD

## 2023-02-02 NOTE — PROGRESS NOTES
NST completed in prenatal testing clinic. NST reactive and reassuring. Patient denies leaking of fluid or vaginal bleeding. Some UC on monitoring- patient does not feel them. Patient reports + fetal movement.

## 2023-02-02 NOTE — PROGRESS NOTES
35w5d  Patient comes in today for follow-up prenatal care  No new complaint.  No vaginal bleeding, contractions, or rupture of membranes.  Good fetal movements.    Eating well without significant nausea/vomiting  Gaining weight   Taking prenatal vitamins, iron supplement, and baby aspirin  Taking Prometrium 200 mg nightly  Taking Prilosec    Doing well with Connected MOM    Glucose tolerance test normal    Discussed with patient MFM's findings and recommendations  Discussed her history of recurrent Abs and progesterone supplement  Discussed work-up  Discussed [id number]. Normal XX  AFP normal  Discussed NST at 32 weeks and delivery at 39 weeks for AMA >40    Back next week    NST NOTES    Patient presents for her office visit and NST.    Indication:    Advanced maternal age at 47 with history of multiple spontaneous     Interpretation:    Fetal heart tones show a baseline of 120 with at least 15 bpm x 15 seconds accelerations and moderate variability.  This is reactive.  No decelerations noted.  Good fetal activities noted.    Time:    Patient was monitored for 23 minutes for this visit    Jose Briscoe MD

## 2023-02-09 ENCOUNTER — CLINICAL SUPPORT (OUTPATIENT)
Dept: OBSTETRICS AND GYNECOLOGY | Facility: CLINIC | Age: 48
End: 2023-02-09
Payer: COMMERCIAL

## 2023-02-09 ENCOUNTER — ROUTINE PRENATAL (OUTPATIENT)
Dept: OBSTETRICS AND GYNECOLOGY | Facility: CLINIC | Age: 48
End: 2023-02-09
Payer: COMMERCIAL

## 2023-02-09 ENCOUNTER — LAB VISIT (OUTPATIENT)
Dept: LAB | Facility: HOSPITAL | Age: 48
End: 2023-02-09
Attending: OBSTETRICS & GYNECOLOGY
Payer: COMMERCIAL

## 2023-02-09 VITALS
DIASTOLIC BLOOD PRESSURE: 70 MMHG | BODY MASS INDEX: 27.51 KG/M2 | WEIGHT: 165.38 LBS | SYSTOLIC BLOOD PRESSURE: 110 MMHG

## 2023-02-09 DIAGNOSIS — Z3A.36 36 WEEKS GESTATION OF PREGNANCY: Primary | ICD-10-CM

## 2023-02-09 DIAGNOSIS — O09.523 MULTIGRAVIDA OF ADVANCED MATERNAL AGE IN THIRD TRIMESTER: ICD-10-CM

## 2023-02-09 DIAGNOSIS — O09.299 HISTORY OF SPONTANEOUS ABORTION, CURRENTLY PREGNANT: ICD-10-CM

## 2023-02-09 DIAGNOSIS — Z3A.36 36 WEEKS GESTATION OF PREGNANCY: ICD-10-CM

## 2023-02-09 LAB
BASOPHILS # BLD AUTO: 0.05 K/UL (ref 0–0.2)
BASOPHILS NFR BLD: 0.9 % (ref 0–1.9)
DIFFERENTIAL METHOD: ABNORMAL
EOSINOPHIL # BLD AUTO: 0.1 K/UL (ref 0–0.5)
EOSINOPHIL NFR BLD: 1.3 % (ref 0–8)
ERYTHROCYTE [DISTWIDTH] IN BLOOD BY AUTOMATED COUNT: 12.8 % (ref 11.5–14.5)
HCT VFR BLD AUTO: 31.7 % (ref 37–48.5)
HGB BLD-MCNC: 10.3 G/DL (ref 12–16)
HIV 1+2 AB+HIV1 P24 AG SERPL QL IA: NORMAL
IMM GRANULOCYTES # BLD AUTO: 0.16 K/UL (ref 0–0.04)
IMM GRANULOCYTES NFR BLD AUTO: 3 % (ref 0–0.5)
LYMPHOCYTES # BLD AUTO: 1.2 K/UL (ref 1–4.8)
LYMPHOCYTES NFR BLD: 21.3 % (ref 18–48)
MCH RBC QN AUTO: 27.7 PG (ref 27–31)
MCHC RBC AUTO-ENTMCNC: 32.5 G/DL (ref 32–36)
MCV RBC AUTO: 85 FL (ref 82–98)
MONOCYTES # BLD AUTO: 0.7 K/UL (ref 0.3–1)
MONOCYTES NFR BLD: 12.2 % (ref 4–15)
NEUTROPHILS # BLD AUTO: 3.3 K/UL (ref 1.8–7.7)
NEUTROPHILS NFR BLD: 61.3 % (ref 38–73)
NRBC BLD-RTO: 0 /100 WBC
PLATELET # BLD AUTO: 225 K/UL (ref 150–450)
PMV BLD AUTO: 10.9 FL (ref 9.2–12.9)
RBC # BLD AUTO: 3.72 M/UL (ref 4–5.4)
WBC # BLD AUTO: 5.41 K/UL (ref 3.9–12.7)

## 2023-02-09 PROCEDURE — 87081 CULTURE SCREEN ONLY: CPT | Performed by: OBSTETRICS & GYNECOLOGY

## 2023-02-09 PROCEDURE — 59025 FETAL NON-STRESS TEST: CPT | Mod: 26,,, | Performed by: OBSTETRICS & GYNECOLOGY

## 2023-02-09 PROCEDURE — 85025 COMPLETE CBC W/AUTO DIFF WBC: CPT | Performed by: OBSTETRICS & GYNECOLOGY

## 2023-02-09 PROCEDURE — 0502F SUBSEQUENT PRENATAL CARE: CPT | Mod: CPTII,S$GLB,, | Performed by: OBSTETRICS & GYNECOLOGY

## 2023-02-09 PROCEDURE — 59025 PR FETAL 2N-STRESS TEST: ICD-10-PCS | Mod: 26,,, | Performed by: OBSTETRICS & GYNECOLOGY

## 2023-02-09 PROCEDURE — 87389 HIV-1 AG W/HIV-1&-2 AB AG IA: CPT | Performed by: OBSTETRICS & GYNECOLOGY

## 2023-02-09 PROCEDURE — 99999 PR PBB SHADOW E&M-EST. PATIENT-LVL III: CPT | Mod: PBBFAC,,, | Performed by: OBSTETRICS & GYNECOLOGY

## 2023-02-09 PROCEDURE — 36415 COLL VENOUS BLD VENIPUNCTURE: CPT | Performed by: OBSTETRICS & GYNECOLOGY

## 2023-02-09 PROCEDURE — 0502F PR SUBSEQUENT PRENATAL CARE: ICD-10-PCS | Mod: CPTII,S$GLB,, | Performed by: OBSTETRICS & GYNECOLOGY

## 2023-02-09 PROCEDURE — 99999 PR PBB SHADOW E&M-EST. PATIENT-LVL III: ICD-10-PCS | Mod: PBBFAC,,, | Performed by: OBSTETRICS & GYNECOLOGY

## 2023-02-09 NOTE — PROGRESS NOTES
OB here for GBS, HIV testing and signing consents today. Pt with pelvic pain and pressure that occurs early in the morning. jtn   Patient notified Rx ready to be picked up.

## 2023-02-11 LAB — BACTERIA SPEC AEROBE CULT: NORMAL

## 2023-02-16 ENCOUNTER — ROUTINE PRENATAL (OUTPATIENT)
Dept: OBSTETRICS AND GYNECOLOGY | Facility: CLINIC | Age: 48
End: 2023-02-16
Payer: COMMERCIAL

## 2023-02-16 ENCOUNTER — CLINICAL SUPPORT (OUTPATIENT)
Dept: OBSTETRICS AND GYNECOLOGY | Facility: CLINIC | Age: 48
End: 2023-02-16
Payer: COMMERCIAL

## 2023-02-16 VITALS
BODY MASS INDEX: 27.85 KG/M2 | SYSTOLIC BLOOD PRESSURE: 112 MMHG | DIASTOLIC BLOOD PRESSURE: 64 MMHG | WEIGHT: 167.31 LBS

## 2023-02-16 DIAGNOSIS — O09.299 HISTORY OF SPONTANEOUS ABORTION, CURRENTLY PREGNANT: ICD-10-CM

## 2023-02-16 DIAGNOSIS — Z3A.37 37 WEEKS GESTATION OF PREGNANCY: Primary | ICD-10-CM

## 2023-02-16 DIAGNOSIS — O09.523 MULTIGRAVIDA OF ADVANCED MATERNAL AGE IN THIRD TRIMESTER: ICD-10-CM

## 2023-02-16 PROCEDURE — 0502F SUBSEQUENT PRENATAL CARE: CPT | Mod: CPTII,S$GLB,, | Performed by: OBSTETRICS & GYNECOLOGY

## 2023-02-16 PROCEDURE — 59025 FETAL NON-STRESS TEST: CPT | Mod: 26,,, | Performed by: OBSTETRICS & GYNECOLOGY

## 2023-02-16 PROCEDURE — 99999 PR PBB SHADOW E&M-EST. PATIENT-LVL III: CPT | Mod: PBBFAC,,, | Performed by: OBSTETRICS & GYNECOLOGY

## 2023-02-16 PROCEDURE — 0502F PR SUBSEQUENT PRENATAL CARE: ICD-10-PCS | Mod: CPTII,S$GLB,, | Performed by: OBSTETRICS & GYNECOLOGY

## 2023-02-16 PROCEDURE — 59025 PR FETAL 2N-STRESS TEST: ICD-10-PCS | Mod: 26,,, | Performed by: OBSTETRICS & GYNECOLOGY

## 2023-02-16 PROCEDURE — 99999 PR PBB SHADOW E&M-EST. PATIENT-LVL III: ICD-10-PCS | Mod: PBBFAC,,, | Performed by: OBSTETRICS & GYNECOLOGY

## 2023-02-16 NOTE — PROGRESS NOTES
37w5d  Patient comes in today for follow-up prenatal care  No new complaint.  No vaginal bleeding, contractions, or rupture of membranes.  Good fetal movements.    Eating well without significant nausea/vomiting  Gaining weight   Taking prenatal vitamins, iron supplement, and baby aspirin  Taking Prometrium 200 mg nightly  Taking Prilosec    Doing well with Connected MOM    Glucose tolerance test normal    Discussed with patient MFM's findings and recommendations  Discussed her history of recurrent Abs and progesterone supplement  Discussed work-up  Discussed [id number]. Normal XX  AFP normal  Discussed NST at 32 weeks and delivery at 39 weeks for AMA >40  Cytotec induction scheduled for 2023 at 1500    Back next week    NST NOTES    Patient presents for her office visit and NST.    Indication:    Advanced maternal age at 47 with history of multiple spontaneous     Interpretation:    Fetal heart tones show a baseline of 130 with at least 15 bpm x 15 seconds accelerations and moderate variability.  This is reactive.  No decelerations noted.  Good fetal activities noted.    Time:    Patient was monitored for 23 minutes for this visit    Jose Briscoe MD

## 2023-02-16 NOTE — PROGRESS NOTES
NST NOTES    Patient presents for her office visit and NST.    Indication:    Advanced maternal age at 47 with history of multiple spontaneous     Interpretation:    Fetal heart tones show a baseline of 130 with at least 15 bpm x 15 seconds accelerations and moderate variability.  This is reactive.  No decelerations noted.  Good fetal activities noted.    Time:    Patient was monitored for 23 minutes for this visit    Jose Briscoe MD

## 2023-02-16 NOTE — PROGRESS NOTES
NST completed in prenatal testing clinic. NST reactive and reassuring. Patient denies leaking of fluid or vaginal bleeding. Irregular UC on monitoring- patient states they are tolerable. Patient reports + fetal movement. Routine OB apt following NST.

## 2023-02-22 ENCOUNTER — TELEPHONE (OUTPATIENT)
Dept: OBSTETRICS AND GYNECOLOGY | Facility: CLINIC | Age: 48
End: 2023-02-22
Payer: COMMERCIAL

## 2023-02-22 NOTE — TELEPHONE ENCOUNTER
Pt is requesting a call from Dr. Briscoe regarding induction scheduled for this Sunday 2/26/2023.  Pt states that she will go have her NST done and come in for induction on Sunday.  She is worried and wants reassurance. karen

## 2023-02-22 NOTE — TELEPHONE ENCOUNTER
I called and talked to patient earlier today  She will come in on Sunday for her induction as previously scheduled   See referral to GYN.

## 2023-02-23 ENCOUNTER — CLINICAL SUPPORT (OUTPATIENT)
Dept: OBSTETRICS AND GYNECOLOGY | Facility: CLINIC | Age: 48
End: 2023-02-23
Payer: COMMERCIAL

## 2023-02-23 ENCOUNTER — ROUTINE PRENATAL (OUTPATIENT)
Dept: OBSTETRICS AND GYNECOLOGY | Facility: CLINIC | Age: 48
End: 2023-02-23
Payer: COMMERCIAL

## 2023-02-23 VITALS
DIASTOLIC BLOOD PRESSURE: 70 MMHG | WEIGHT: 169.06 LBS | SYSTOLIC BLOOD PRESSURE: 120 MMHG | BODY MASS INDEX: 28.14 KG/M2

## 2023-02-23 DIAGNOSIS — O09.299 HISTORY OF SPONTANEOUS ABORTION, CURRENTLY PREGNANT: ICD-10-CM

## 2023-02-23 DIAGNOSIS — Z3A.38 38 WEEKS GESTATION OF PREGNANCY: Primary | ICD-10-CM

## 2023-02-23 DIAGNOSIS — O09.523 MULTIGRAVIDA OF ADVANCED MATERNAL AGE IN THIRD TRIMESTER: ICD-10-CM

## 2023-02-23 PROCEDURE — 59025 FETAL NON-STRESS TEST: CPT | Mod: 26,,, | Performed by: OBSTETRICS & GYNECOLOGY

## 2023-02-23 PROCEDURE — 0502F PR SUBSEQUENT PRENATAL CARE: ICD-10-PCS | Mod: CPTII,S$GLB,, | Performed by: OBSTETRICS & GYNECOLOGY

## 2023-02-23 PROCEDURE — 99999 PR PBB SHADOW E&M-EST. PATIENT-LVL III: CPT | Mod: PBBFAC,,, | Performed by: OBSTETRICS & GYNECOLOGY

## 2023-02-23 PROCEDURE — 0502F SUBSEQUENT PRENATAL CARE: CPT | Mod: CPTII,S$GLB,, | Performed by: OBSTETRICS & GYNECOLOGY

## 2023-02-23 PROCEDURE — 99999 PR PBB SHADOW E&M-EST. PATIENT-LVL III: ICD-10-PCS | Mod: PBBFAC,,, | Performed by: OBSTETRICS & GYNECOLOGY

## 2023-02-23 PROCEDURE — 59025 PR FETAL 2N-STRESS TEST: ICD-10-PCS | Mod: 26,,, | Performed by: OBSTETRICS & GYNECOLOGY

## 2023-02-23 RX ORDER — MISOPROSTOL 100 UG/1
800 TABLET ORAL ONCE AS NEEDED
Status: CANCELLED | OUTPATIENT
Start: 2023-02-23

## 2023-02-23 RX ORDER — TERBUTALINE SULFATE 1 MG/ML
0.25 INJECTION SUBCUTANEOUS
Status: CANCELLED | OUTPATIENT
Start: 2023-02-23

## 2023-02-23 RX ORDER — MISOPROSTOL 100 MCG
50 TABLET ORAL EVERY 4 HOURS PRN
Status: CANCELLED | OUTPATIENT
Start: 2023-02-23

## 2023-02-23 RX ORDER — OXYTOCIN/RINGER'S LACTATE 30/500 ML
0-30 PLASTIC BAG, INJECTION (ML) INTRAVENOUS CONTINUOUS
Status: CANCELLED | OUTPATIENT
Start: 2023-02-23

## 2023-02-23 RX ORDER — PROCHLORPERAZINE EDISYLATE 5 MG/ML
5 INJECTION INTRAMUSCULAR; INTRAVENOUS EVERY 6 HOURS PRN
Status: CANCELLED | OUTPATIENT
Start: 2023-02-23

## 2023-02-23 RX ORDER — CARBOPROST TROMETHAMINE 250 UG/ML
250 INJECTION, SOLUTION INTRAMUSCULAR
Status: CANCELLED | OUTPATIENT
Start: 2023-02-23

## 2023-02-23 RX ORDER — OXYTOCIN 10 [USP'U]/ML
10 INJECTION, SOLUTION INTRAMUSCULAR; INTRAVENOUS ONCE AS NEEDED
Status: CANCELLED | OUTPATIENT
Start: 2023-02-23 | End: 2034-07-22

## 2023-02-23 RX ORDER — MISOPROSTOL 100 UG/1
800 TABLET ORAL ONCE AS NEEDED
Status: CANCELLED | OUTPATIENT
Start: 2023-02-23 | End: 2034-07-22

## 2023-02-23 RX ORDER — MISOPROSTOL 100 UG/1
800 TABLET ORAL
Status: CANCELLED | OUTPATIENT
Start: 2023-02-23

## 2023-02-23 RX ORDER — LIDOCAINE HYDROCHLORIDE 10 MG/ML
10 INJECTION INFILTRATION; PERINEURAL ONCE AS NEEDED
Status: CANCELLED | OUTPATIENT
Start: 2023-02-23 | End: 2034-07-22

## 2023-02-23 RX ORDER — SODIUM CHLORIDE, SODIUM LACTATE, POTASSIUM CHLORIDE, CALCIUM CHLORIDE 600; 310; 30; 20 MG/100ML; MG/100ML; MG/100ML; MG/100ML
INJECTION, SOLUTION INTRAVENOUS CONTINUOUS
Status: CANCELLED | OUTPATIENT
Start: 2023-02-23

## 2023-02-23 RX ORDER — OXYTOCIN/RINGER'S LACTATE 30/500 ML
95 PLASTIC BAG, INJECTION (ML) INTRAVENOUS ONCE AS NEEDED
Status: CANCELLED | OUTPATIENT
Start: 2023-02-23 | End: 2034-07-22

## 2023-02-23 RX ORDER — OXYTOCIN/RINGER'S LACTATE 30/500 ML
334 PLASTIC BAG, INJECTION (ML) INTRAVENOUS ONCE AS NEEDED
Status: CANCELLED | OUTPATIENT
Start: 2023-02-23 | End: 2034-07-22

## 2023-02-23 RX ORDER — CALCIUM CARBONATE 200(500)MG
500 TABLET,CHEWABLE ORAL 3 TIMES DAILY PRN
Status: CANCELLED | OUTPATIENT
Start: 2023-02-23

## 2023-02-23 RX ORDER — OXYTOCIN/RINGER'S LACTATE 30/500 ML
334 PLASTIC BAG, INJECTION (ML) INTRAVENOUS ONCE
Status: CANCELLED | OUTPATIENT
Start: 2023-02-23 | End: 2023-02-23

## 2023-02-23 RX ORDER — METHYLERGONOVINE MALEATE 0.2 MG/ML
200 INJECTION INTRAVENOUS
Status: CANCELLED | OUTPATIENT
Start: 2023-02-23

## 2023-02-23 RX ORDER — ONDANSETRON 4 MG/1
8 TABLET, ORALLY DISINTEGRATING ORAL EVERY 8 HOURS PRN
Status: CANCELLED | OUTPATIENT
Start: 2023-02-23

## 2023-02-23 RX ORDER — MUPIROCIN 20 MG/G
OINTMENT TOPICAL
Status: CANCELLED | OUTPATIENT
Start: 2023-02-23

## 2023-02-23 RX ORDER — SIMETHICONE 80 MG
1 TABLET,CHEWABLE ORAL 4 TIMES DAILY PRN
Status: CANCELLED | OUTPATIENT
Start: 2023-02-23

## 2023-02-23 RX ORDER — DIPHENOXYLATE HYDROCHLORIDE AND ATROPINE SULFATE 2.5; .025 MG/1; MG/1
1 TABLET ORAL 4 TIMES DAILY PRN
Status: CANCELLED | OUTPATIENT
Start: 2023-02-23

## 2023-02-23 RX ORDER — OXYTOCIN/RINGER'S LACTATE 30/500 ML
95 PLASTIC BAG, INJECTION (ML) INTRAVENOUS ONCE
Status: CANCELLED | OUTPATIENT
Start: 2023-02-23 | End: 2023-02-23

## 2023-02-23 RX ORDER — BUTORPHANOL TARTRATE 1 MG/ML
2 INJECTION INTRAMUSCULAR; INTRAVENOUS
Status: CANCELLED | OUTPATIENT
Start: 2023-02-23

## 2023-02-23 NOTE — PROGRESS NOTES
NST completed in prenatal testing clinic. NST reactive and reassuring. Patient denies cxtn, leaking of fluid or vaginal bleeding. Patient reports + fetal movement. Routine OB apt today.

## 2023-02-23 NOTE — PROGRESS NOTES
38w5d  Patient comes in today for follow-up prenatal care  No new complaint.  No vaginal bleeding, contractions, or rupture of membranes.  Good fetal movements.    Eating well without significant nausea/vomiting  Gaining weight   Taking prenatal vitamins, iron supplement, and baby aspirin  Taking Prometrium 200 mg nightly  Taking Prilosec    Doing well with Connected MOM    Glucose tolerance test normal    Discussed with patient MFM's findings and recommendations  Discussed her history of recurrent Abs and progesterone supplement  Discussed work-up  Discussed SidleglF43. Normal XX  AFP normal  Discussed NST at 32 weeks and delivery at 39 weeks for AMA >40  Cytotec induction scheduled for 2023 at 1500  Orders in    NST NOTES    Patient presents for her office visit and NST.    Indication:    Advanced maternal age at 47 with history of multiple spontaneous     Interpretation:    Fetal heart tones show a baseline of 130 with at least 15 bpm x 15 seconds accelerations and moderate variability.  This is reactive.  No decelerations noted.  Good fetal activities noted.    Time:    Patient was monitored for 22 minutes for this visit    Jose Briscoe MD

## 2023-02-26 ENCOUNTER — HOSPITAL ENCOUNTER (INPATIENT)
Facility: HOSPITAL | Age: 48
LOS: 2 days | Discharge: HOME | End: 2023-02-28
Attending: OBSTETRICS & GYNECOLOGY | Admitting: OBSTETRICS & GYNECOLOGY
Payer: COMMERCIAL

## 2023-02-26 DIAGNOSIS — Z3A.38 38 WEEKS GESTATION OF PREGNANCY: ICD-10-CM

## 2023-02-26 DIAGNOSIS — O09.299 HISTORY OF SPONTANEOUS ABORTION, CURRENTLY PREGNANT: ICD-10-CM

## 2023-02-26 DIAGNOSIS — Z3A.39 39 WEEKS GESTATION OF PREGNANCY: ICD-10-CM

## 2023-02-26 DIAGNOSIS — O09.523 MULTIGRAVIDA OF ADVANCED MATERNAL AGE IN THIRD TRIMESTER: ICD-10-CM

## 2023-02-26 PROBLEM — O26.23 HIGH RISK PREGNANCY DUE TO RECURRENT PREGNANCY LOSS, THIRD TRIMESTER: Status: ACTIVE | Noted: 2022-08-01

## 2023-02-26 LAB
ABO + RH BLD: NORMAL
ALBUMIN SERPL BCP-MCNC: 2.7 G/DL (ref 3.5–5.2)
ALP SERPL-CCNC: 73 U/L (ref 55–135)
ALT SERPL W/O P-5'-P-CCNC: 25 U/L (ref 10–44)
ANION GAP SERPL CALC-SCNC: 9 MMOL/L (ref 8–16)
AST SERPL-CCNC: 23 U/L (ref 10–40)
BASOPHILS # BLD AUTO: 0.05 K/UL (ref 0–0.2)
BASOPHILS NFR BLD: 0.9 % (ref 0–1.9)
BILIRUB SERPL-MCNC: 0.4 MG/DL (ref 0.1–1)
BLD GP AB SCN CELLS X3 SERPL QL: NORMAL
BUN SERPL-MCNC: 10 MG/DL (ref 6–20)
CALCIUM SERPL-MCNC: 8.8 MG/DL (ref 8.7–10.5)
CHLORIDE SERPL-SCNC: 108 MMOL/L (ref 95–110)
CO2 SERPL-SCNC: 22 MMOL/L (ref 23–29)
CREAT SERPL-MCNC: 0.8 MG/DL (ref 0.5–1.4)
DIFFERENTIAL METHOD: ABNORMAL
EOSINOPHIL # BLD AUTO: 0.1 K/UL (ref 0–0.5)
EOSINOPHIL NFR BLD: 0.9 % (ref 0–8)
ERYTHROCYTE [DISTWIDTH] IN BLOOD BY AUTOMATED COUNT: 13 % (ref 11.5–14.5)
EST. GFR  (NO RACE VARIABLE): >60 ML/MIN/1.73 M^2
GLUCOSE SERPL-MCNC: 86 MG/DL (ref 70–110)
HCT VFR BLD AUTO: 31.9 % (ref 37–48.5)
HGB BLD-MCNC: 10.5 G/DL (ref 12–16)
IMM GRANULOCYTES # BLD AUTO: 0.21 K/UL (ref 0–0.04)
IMM GRANULOCYTES NFR BLD AUTO: 3.8 % (ref 0–0.5)
LYMPHOCYTES # BLD AUTO: 1.1 K/UL (ref 1–4.8)
LYMPHOCYTES NFR BLD: 20.5 % (ref 18–48)
MCH RBC QN AUTO: 28 PG (ref 27–31)
MCHC RBC AUTO-ENTMCNC: 32.9 G/DL (ref 32–36)
MCV RBC AUTO: 85 FL (ref 82–98)
MONOCYTES # BLD AUTO: 0.6 K/UL (ref 0.3–1)
MONOCYTES NFR BLD: 10.4 % (ref 4–15)
NEUTROPHILS # BLD AUTO: 3.5 K/UL (ref 1.8–7.7)
NEUTROPHILS NFR BLD: 63.5 % (ref 38–73)
NRBC BLD-RTO: 0 /100 WBC
PLATELET # BLD AUTO: 201 K/UL (ref 150–450)
PMV BLD AUTO: 11.3 FL (ref 9.2–12.9)
POTASSIUM SERPL-SCNC: 3.7 MMOL/L (ref 3.5–5.1)
PROT SERPL-MCNC: 6.3 G/DL (ref 6–8.4)
RBC # BLD AUTO: 3.75 M/UL (ref 4–5.4)
SODIUM SERPL-SCNC: 139 MMOL/L (ref 136–145)
WBC # BLD AUTO: 5.56 K/UL (ref 3.9–12.7)

## 2023-02-26 PROCEDURE — 11000001 HC ACUTE MED/SURG PRIVATE ROOM

## 2023-02-26 PROCEDURE — 63600175 PHARM REV CODE 636 W HCPCS: Performed by: OBSTETRICS & GYNECOLOGY

## 2023-02-26 PROCEDURE — 86592 SYPHILIS TEST NON-TREP QUAL: CPT | Performed by: OBSTETRICS & GYNECOLOGY

## 2023-02-26 PROCEDURE — 59025 FETAL NON-STRESS TEST: CPT

## 2023-02-26 PROCEDURE — 36415 COLL VENOUS BLD VENIPUNCTURE: CPT | Performed by: OBSTETRICS & GYNECOLOGY

## 2023-02-26 PROCEDURE — 86900 BLOOD TYPING SEROLOGIC ABO: CPT | Performed by: OBSTETRICS & GYNECOLOGY

## 2023-02-26 PROCEDURE — 85025 COMPLETE CBC W/AUTO DIFF WBC: CPT | Performed by: OBSTETRICS & GYNECOLOGY

## 2023-02-26 PROCEDURE — 80053 COMPREHEN METABOLIC PANEL: CPT | Performed by: OBSTETRICS & GYNECOLOGY

## 2023-02-26 PROCEDURE — 72100003 HC LABOR CARE, EA. ADDL. 8 HRS

## 2023-02-26 PROCEDURE — 72100002 HC LABOR CARE, 1ST 8 HOURS

## 2023-02-26 PROCEDURE — 25000003 PHARM REV CODE 250: Performed by: OBSTETRICS & GYNECOLOGY

## 2023-02-26 RX ORDER — TRANEXAMIC ACID 10 MG/ML
1000 INJECTION, SOLUTION INTRAVENOUS ONCE AS NEEDED
Status: DISCONTINUED | OUTPATIENT
Start: 2023-02-26 | End: 2023-02-27

## 2023-02-26 RX ORDER — CARBOPROST TROMETHAMINE 250 UG/ML
250 INJECTION, SOLUTION INTRAMUSCULAR
Status: DISCONTINUED | OUTPATIENT
Start: 2023-02-26 | End: 2023-02-27

## 2023-02-26 RX ORDER — SODIUM CHLORIDE, SODIUM LACTATE, POTASSIUM CHLORIDE, CALCIUM CHLORIDE 600; 310; 30; 20 MG/100ML; MG/100ML; MG/100ML; MG/100ML
INJECTION, SOLUTION INTRAVENOUS CONTINUOUS
Status: DISCONTINUED | OUTPATIENT
Start: 2023-02-26 | End: 2023-02-27

## 2023-02-26 RX ORDER — OXYTOCIN/RINGER'S LACTATE 30/500 ML
0-30 PLASTIC BAG, INJECTION (ML) INTRAVENOUS CONTINUOUS
Status: DISCONTINUED | OUTPATIENT
Start: 2023-02-26 | End: 2023-02-27

## 2023-02-26 RX ORDER — MISOPROSTOL 100 MCG
50 TABLET ORAL EVERY 4 HOURS PRN
Status: DISCONTINUED | OUTPATIENT
Start: 2023-02-26 | End: 2023-02-27

## 2023-02-26 RX ORDER — MISOPROSTOL 200 UG/1
800 TABLET ORAL ONCE AS NEEDED
Status: DISCONTINUED | OUTPATIENT
Start: 2023-02-26 | End: 2023-02-27

## 2023-02-26 RX ORDER — CALCIUM CARBONATE 200(500)MG
500 TABLET,CHEWABLE ORAL 3 TIMES DAILY PRN
Status: DISCONTINUED | OUTPATIENT
Start: 2023-02-26 | End: 2023-02-27

## 2023-02-26 RX ORDER — OXYTOCIN 10 [USP'U]/ML
10 INJECTION, SOLUTION INTRAMUSCULAR; INTRAVENOUS ONCE AS NEEDED
Status: DISCONTINUED | OUTPATIENT
Start: 2023-02-26 | End: 2023-02-27

## 2023-02-26 RX ORDER — SIMETHICONE 80 MG
1 TABLET,CHEWABLE ORAL 4 TIMES DAILY PRN
Status: DISCONTINUED | OUTPATIENT
Start: 2023-02-26 | End: 2023-02-27

## 2023-02-26 RX ORDER — METHYLERGONOVINE MALEATE 0.2 MG/ML
200 INJECTION INTRAVENOUS
Status: DISCONTINUED | OUTPATIENT
Start: 2023-02-26 | End: 2023-02-27

## 2023-02-26 RX ORDER — TERBUTALINE SULFATE 1 MG/ML
0.25 INJECTION SUBCUTANEOUS
Status: DISCONTINUED | OUTPATIENT
Start: 2023-02-26 | End: 2023-02-27

## 2023-02-26 RX ORDER — OXYTOCIN/RINGER'S LACTATE 30/500 ML
95 PLASTIC BAG, INJECTION (ML) INTRAVENOUS ONCE AS NEEDED
Status: DISCONTINUED | OUTPATIENT
Start: 2023-02-26 | End: 2023-02-27

## 2023-02-26 RX ORDER — ONDANSETRON 8 MG/1
8 TABLET, ORALLY DISINTEGRATING ORAL EVERY 8 HOURS PRN
Status: DISCONTINUED | OUTPATIENT
Start: 2023-02-26 | End: 2023-02-27

## 2023-02-26 RX ORDER — OXYTOCIN/RINGER'S LACTATE 30/500 ML
334 PLASTIC BAG, INJECTION (ML) INTRAVENOUS ONCE
Status: COMPLETED | OUTPATIENT
Start: 2023-02-26 | End: 2023-02-27

## 2023-02-26 RX ORDER — OXYTOCIN/RINGER'S LACTATE 30/500 ML
95 PLASTIC BAG, INJECTION (ML) INTRAVENOUS ONCE
Status: DISCONTINUED | OUTPATIENT
Start: 2023-02-26 | End: 2023-02-27

## 2023-02-26 RX ORDER — OXYTOCIN/RINGER'S LACTATE 30/500 ML
334 PLASTIC BAG, INJECTION (ML) INTRAVENOUS ONCE AS NEEDED
Status: DISCONTINUED | OUTPATIENT
Start: 2023-02-26 | End: 2023-02-27

## 2023-02-26 RX ORDER — PROCHLORPERAZINE EDISYLATE 5 MG/ML
5 INJECTION INTRAMUSCULAR; INTRAVENOUS EVERY 6 HOURS PRN
Status: DISCONTINUED | OUTPATIENT
Start: 2023-02-26 | End: 2023-02-27

## 2023-02-26 RX ORDER — BUTORPHANOL TARTRATE 1 MG/ML
2 INJECTION INTRAMUSCULAR; INTRAVENOUS
Status: DISCONTINUED | OUTPATIENT
Start: 2023-02-26 | End: 2023-02-27

## 2023-02-26 RX ORDER — LIDOCAINE HYDROCHLORIDE 10 MG/ML
10 INJECTION INFILTRATION; PERINEURAL ONCE AS NEEDED
Status: COMPLETED | OUTPATIENT
Start: 2023-02-26 | End: 2023-02-27

## 2023-02-26 RX ADMIN — MISOPROSTOL 50 MCG: 100 TABLET ORAL at 03:02

## 2023-02-26 RX ADMIN — Medication 2 MILLI-UNITS/MIN: at 09:02

## 2023-02-26 RX ADMIN — SODIUM CHLORIDE, POTASSIUM CHLORIDE, SODIUM LACTATE AND CALCIUM CHLORIDE: 600; 310; 30; 20 INJECTION, SOLUTION INTRAVENOUS at 09:02

## 2023-02-26 RX ADMIN — SODIUM CHLORIDE, POTASSIUM CHLORIDE, SODIUM LACTATE AND CALCIUM CHLORIDE: 600; 310; 30; 20 INJECTION, SOLUTION INTRAVENOUS at 03:02

## 2023-02-26 NOTE — HPI
46 yo  at 39w1d  Advanced maternal age at 46 yo  History of multiple early miscarriages with work-up including elevated anticardiolipin antibodies I\gM x 2  Has been getting NST  Admitted today, 23 for labor induction per MFM's recommendations  Risks and benefits discussed

## 2023-02-26 NOTE — NURSING
Arrived on unit for scheduled induction.Oriented to room.OOB to shower.Instructed to call when finshed   Detail Level: Simple

## 2023-02-27 PROBLEM — O26.23 HIGH RISK PREGNANCY DUE TO RECURRENT PREGNANCY LOSS, THIRD TRIMESTER: Status: RESOLVED | Noted: 2022-08-01 | Resolved: 2023-02-27

## 2023-02-27 PROBLEM — Z3A.39 39 WEEKS GESTATION OF PREGNANCY: Status: RESOLVED | Noted: 2023-02-26 | Resolved: 2023-02-27

## 2023-02-27 PROBLEM — Z3A.38 38 WEEKS GESTATION OF PREGNANCY: Status: RESOLVED | Noted: 2023-02-27 | Resolved: 2023-02-27

## 2023-02-27 PROBLEM — Z3A.38 38 WEEKS GESTATION OF PREGNANCY: Status: ACTIVE | Noted: 2023-02-27

## 2023-02-27 PROBLEM — O09.523 MULTIGRAVIDA OF ADVANCED MATERNAL AGE IN THIRD TRIMESTER: Status: RESOLVED | Noted: 2022-08-01 | Resolved: 2023-02-27

## 2023-02-27 PROCEDURE — 59400 PR FULL ROUT OBSTE CARE,VAGINAL DELIV: ICD-10-PCS | Mod: GB,,, | Performed by: OBSTETRICS & GYNECOLOGY

## 2023-02-27 PROCEDURE — 72100003 HC LABOR CARE, EA. ADDL. 8 HRS

## 2023-02-27 PROCEDURE — 25000003 PHARM REV CODE 250: Performed by: OBSTETRICS & GYNECOLOGY

## 2023-02-27 PROCEDURE — 72200006 HC VAGINAL DELIVERY LEVEL III

## 2023-02-27 PROCEDURE — 11000001 HC ACUTE MED/SURG PRIVATE ROOM

## 2023-02-27 PROCEDURE — 72200005 HC VAGINAL DELIVERY LEVEL II

## 2023-02-27 PROCEDURE — 63600175 PHARM REV CODE 636 W HCPCS: Performed by: OBSTETRICS & GYNECOLOGY

## 2023-02-27 PROCEDURE — 59400 OBSTETRICAL CARE: CPT | Mod: GB,,, | Performed by: OBSTETRICS & GYNECOLOGY

## 2023-02-27 RX ORDER — OXYTOCIN/RINGER'S LACTATE 30/500 ML
334 PLASTIC BAG, INJECTION (ML) INTRAVENOUS ONCE AS NEEDED
Status: DISCONTINUED | OUTPATIENT
Start: 2023-02-27 | End: 2023-02-28 | Stop reason: HOSPADM

## 2023-02-27 RX ORDER — METHYLERGONOVINE MALEATE 0.2 MG/ML
200 INJECTION INTRAVENOUS
Status: DISCONTINUED | OUTPATIENT
Start: 2023-02-27 | End: 2023-02-28 | Stop reason: HOSPADM

## 2023-02-27 RX ORDER — PROCHLORPERAZINE EDISYLATE 5 MG/ML
5 INJECTION INTRAMUSCULAR; INTRAVENOUS EVERY 6 HOURS PRN
Status: DISCONTINUED | OUTPATIENT
Start: 2023-02-27 | End: 2023-02-28 | Stop reason: HOSPADM

## 2023-02-27 RX ORDER — DOCUSATE SODIUM 100 MG/1
200 CAPSULE, LIQUID FILLED ORAL 2 TIMES DAILY PRN
Status: DISCONTINUED | OUTPATIENT
Start: 2023-02-27 | End: 2023-02-28 | Stop reason: HOSPADM

## 2023-02-27 RX ORDER — HYDROCORTISONE 25 MG/G
CREAM TOPICAL 3 TIMES DAILY PRN
Status: DISCONTINUED | OUTPATIENT
Start: 2023-02-27 | End: 2023-02-28 | Stop reason: HOSPADM

## 2023-02-27 RX ORDER — PRENATAL WITH FERROUS FUM AND FOLIC ACID 3080; 920; 120; 400; 22; 1.84; 3; 20; 10; 1; 12; 200; 27; 25; 2 [IU]/1; [IU]/1; MG/1; [IU]/1; MG/1; MG/1; MG/1; MG/1; MG/1; MG/1; UG/1; MG/1; MG/1; MG/1; MG/1
1 TABLET ORAL DAILY
Status: DISCONTINUED | OUTPATIENT
Start: 2023-02-27 | End: 2023-02-28 | Stop reason: HOSPADM

## 2023-02-27 RX ORDER — DIPHENHYDRAMINE HCL 25 MG
25 CAPSULE ORAL EVERY 4 HOURS PRN
Status: DISCONTINUED | OUTPATIENT
Start: 2023-02-27 | End: 2023-02-28 | Stop reason: HOSPADM

## 2023-02-27 RX ORDER — OXYTOCIN/RINGER'S LACTATE 30/500 ML
95 PLASTIC BAG, INJECTION (ML) INTRAVENOUS ONCE
Status: COMPLETED | OUTPATIENT
Start: 2023-02-27 | End: 2023-02-27

## 2023-02-27 RX ORDER — MISOPROSTOL 200 UG/1
800 TABLET ORAL ONCE AS NEEDED
Status: DISCONTINUED | OUTPATIENT
Start: 2023-02-27 | End: 2023-02-28 | Stop reason: HOSPADM

## 2023-02-27 RX ORDER — OXYTOCIN/RINGER'S LACTATE 30/500 ML
95 PLASTIC BAG, INJECTION (ML) INTRAVENOUS ONCE AS NEEDED
Status: DISCONTINUED | OUTPATIENT
Start: 2023-02-27 | End: 2023-02-28 | Stop reason: HOSPADM

## 2023-02-27 RX ORDER — ACETAMINOPHEN 325 MG/1
650 TABLET ORAL EVERY 6 HOURS PRN
Status: DISCONTINUED | OUTPATIENT
Start: 2023-02-27 | End: 2023-02-28 | Stop reason: HOSPADM

## 2023-02-27 RX ORDER — DIPHENHYDRAMINE HYDROCHLORIDE 50 MG/ML
25 INJECTION INTRAMUSCULAR; INTRAVENOUS EVERY 4 HOURS PRN
Status: DISCONTINUED | OUTPATIENT
Start: 2023-02-27 | End: 2023-02-28 | Stop reason: HOSPADM

## 2023-02-27 RX ORDER — MUPIROCIN 20 MG/G
OINTMENT TOPICAL
Status: DISCONTINUED | OUTPATIENT
Start: 2023-02-27 | End: 2023-02-28 | Stop reason: HOSPADM

## 2023-02-27 RX ORDER — DIPHENOXYLATE HYDROCHLORIDE AND ATROPINE SULFATE 2.5; .025 MG/1; MG/1
1 TABLET ORAL 4 TIMES DAILY PRN
Status: DISCONTINUED | OUTPATIENT
Start: 2023-02-27 | End: 2023-02-28 | Stop reason: HOSPADM

## 2023-02-27 RX ORDER — MISOPROSTOL 200 UG/1
800 TABLET ORAL
Status: DISCONTINUED | OUTPATIENT
Start: 2023-02-27 | End: 2023-02-28 | Stop reason: HOSPADM

## 2023-02-27 RX ORDER — SIMETHICONE 80 MG
1 TABLET,CHEWABLE ORAL EVERY 6 HOURS PRN
Status: DISCONTINUED | OUTPATIENT
Start: 2023-02-27 | End: 2023-02-28 | Stop reason: HOSPADM

## 2023-02-27 RX ORDER — OXYTOCIN 10 [USP'U]/ML
10 INJECTION, SOLUTION INTRAMUSCULAR; INTRAVENOUS ONCE AS NEEDED
Status: DISCONTINUED | OUTPATIENT
Start: 2023-02-27 | End: 2023-02-28 | Stop reason: HOSPADM

## 2023-02-27 RX ORDER — IBUPROFEN 600 MG/1
600 TABLET ORAL EVERY 6 HOURS
Status: DISCONTINUED | OUTPATIENT
Start: 2023-02-27 | End: 2023-02-28 | Stop reason: HOSPADM

## 2023-02-27 RX ORDER — TRANEXAMIC ACID 10 MG/ML
1000 INJECTION, SOLUTION INTRAVENOUS ONCE AS NEEDED
Status: DISCONTINUED | OUTPATIENT
Start: 2023-02-27 | End: 2023-02-28 | Stop reason: HOSPADM

## 2023-02-27 RX ORDER — ONDANSETRON 8 MG/1
8 TABLET, ORALLY DISINTEGRATING ORAL EVERY 8 HOURS PRN
Status: DISCONTINUED | OUTPATIENT
Start: 2023-02-27 | End: 2023-02-28 | Stop reason: HOSPADM

## 2023-02-27 RX ORDER — CARBOPROST TROMETHAMINE 250 UG/ML
250 INJECTION, SOLUTION INTRAMUSCULAR
Status: DISCONTINUED | OUTPATIENT
Start: 2023-02-27 | End: 2023-02-28 | Stop reason: HOSPADM

## 2023-02-27 RX ORDER — OXYCODONE AND ACETAMINOPHEN 5; 325 MG/1; MG/1
1 TABLET ORAL EVERY 4 HOURS PRN
Status: DISCONTINUED | OUTPATIENT
Start: 2023-02-27 | End: 2023-02-28 | Stop reason: HOSPADM

## 2023-02-27 RX ADMIN — IBUPROFEN 600 MG: 600 TABLET ORAL at 11:02

## 2023-02-27 RX ADMIN — Medication 95 MILLI-UNITS/MIN: at 06:02

## 2023-02-27 RX ADMIN — IBUPROFEN 600 MG: 600 TABLET ORAL at 06:02

## 2023-02-27 RX ADMIN — LIDOCAINE HYDROCHLORIDE 10 ML: 10 INJECTION, SOLUTION INFILTRATION; PERINEURAL at 05:02

## 2023-02-27 RX ADMIN — BUTORPHANOL TARTRATE 2 MG: 1 INJECTION, SOLUTION INTRAMUSCULAR; INTRAVENOUS at 02:02

## 2023-02-27 RX ADMIN — Medication 334 MILLI-UNITS/MIN: at 05:02

## 2023-02-27 RX ADMIN — IBUPROFEN 600 MG: 600 TABLET ORAL at 12:02

## 2023-02-27 NOTE — ASSESSMENT & PLAN NOTE
Admit for induction  Cytotec  Pitocin after Cytotec  Pain control   AROM in AM once deemed safe  Expect  later tomorrow, 23

## 2023-02-27 NOTE — HOSPITAL COURSE
Oral Cytotec 50 mcg at 1538  Pitocin  Progressed very quickly once she got to  4 cm at 0430 23 with SROM  Complete at 0511 23    Viable female infant at  23  Weight: 6#7.4 2930 gm  Apgar: 99  Placenta: spontaneous and intact with three vessels  Small second-degree perineal laceration. Repaired with 1% lidocaine and 2.0 chromic  Bilateral upper labial laceration did not need to be repaired  EBL: 300 cc  No complication  No specimen    23  Postpartum course was benign.  She is breast-feeding well.  Exam was benign with patient afebrile, vitals stable, and minimal bleeding.  Normal activities.  Patient discharged home on postpartum day #1, 23 per request   Discharge medications include Motrin, prenatal vitamins, and iron supplement.  Follow-up with me in 6 weeks.    Jose Briscoe MD.

## 2023-02-27 NOTE — SUBJECTIVE & OBJECTIVE
Obstetric HPI:  Patient reports None contractions, active fetal movement, No vaginal bleeding , No loss of fluid     This pregnancy has been complicated by   Advanced maternal age  History of multiple spontaneous abortions with elevated KATHI-IgM x 2.  Has been on supplemental Prometrium    OB History    Para Term  AB Living   6 2 2 0 3 2   SAB IAB Ectopic Multiple Live Births   1 0 0 0 2      # Outcome Date GA Lbr Kulwinder/2nd Weight Sex Delivery Anes PTL Lv   6 Current            5 SAB 2021 7w2d    SAB      4 AB 2020 8w0d    SAB      3 AB 20 10w0d    SAB      2 Term 10/12/10   3.175 kg (7 lb) F Vag-Spont None N LIDA      Name: ines   1 Term 04/15/05   2.722 kg (6 lb) F Vag-Spont EPI N LIDA      Name: Clare     Past Medical History:   Diagnosis Date    Cervical high risk HPV (human papillomavirus) test positive 2020    REPEAT CO-TETING IN 1 YEAR     Past Surgical History:   Procedure Laterality Date    COLONOSCOPY      pt unsure when and why       PTA Medications   Medication Sig    aspirin (ECOTRIN) 81 MG EC tablet Take 81 mg by mouth once daily.    PNV62-FA-om3-dha-epa-fish oil (PRENATAL GUMMY) 400 mcg-35 mg -25 mg-5 mg Chew Take by mouth.    progesterone (PROMETRIUM) 200 MG capsule Take 2 capsules (400 mg total) by mouth nightly.       Review of patient's allergies indicates:  No Known Allergies     Family History       Problem Relation (Age of Onset)    Cancer Paternal Grandmother    Diabetes Father    Hypertension Mother    Sickle cell trait Daughter          Tobacco Use    Smoking status: Never    Smokeless tobacco: Former   Substance and Sexual Activity    Alcohol use: Yes     Comment: not since     Drug use: Never    Sexual activity: Yes     Partners: Male     Birth control/protection: None     Review of Systems   Objective:     Vital Signs (Most Recent):  Temp: 98.4 °F (36.9 °C) (23 1500)  Pulse: 72 (23 1600)  BP: 111/69 (23 1500)  SpO2: 100 % (23 1600)  Vital Signs (24h Range):  Temp:  [98.4 °F (36.9 °C)] 98.4 °F (36.9 °C)  Pulse:  [72-84] 72  SpO2:  [100 %] 100 %  BP: (111)/(69) 111/69     Weight: 76.7 kg (169 lb)  Body mass index is 28.12 kg/m².    FHT: 130 Cat 1 (reassuring)  TOCO:  Q 3 minutes    Physical Exam    Cervix:  Dilation:  0  Effacement:  25%  Station: -3  Presentation: Vertex per Nursing staff     Significant Labs:  Lab Results   Component Value Date    GROUPTRH A POS 02/26/2023    HEPBSAG Non-reactive 09/22/2022    STREPBCULT No Group B Streptococcus isolated 02/09/2023       CBC:   Recent Labs   Lab 02/26/23  1615   WBC 5.56   RBC 3.75*   HGB 10.5*   HCT 31.9*      MCV 85   MCH 28.0   MCHC 32.9     I have personallly reviewed all pertinent lab results from the last 24 hours.

## 2023-02-27 NOTE — SUBJECTIVE & OBJECTIVE
Interval History:   Status post vaginal delivery    She is doing well this morning. She is not tolerating a regular diet without nausea or vomiting. She is not voiding spontaneously. She is not ambulating. She has not passed flatus, and has not a BM. Vaginal bleeding is mild. She denies fever or chills. Abdominal pain is mild and controlled with oral medications. She Is breastfeeding. She desires circumcision for her male baby: not applicable.    Objective:     Vital Signs (Most Recent):  Temp: 98.4 °F (36.9 °C) (02/26/23 1944)  Pulse: 86 (02/27/23 0509)  Resp: 18 (02/26/23 1944)  BP: (!) 151/80 (02/27/23 0507)  SpO2: 100 % (02/27/23 0509)   Vital Signs (24h Range):  Temp:  [98.4 °F (36.9 °C)] 98.4 °F (36.9 °C)  Pulse:  [49-89] 86  Resp:  [18] 18  SpO2:  [95 %-100 %] 100 %  BP: (105-154)/(56-84) 151/80     Weight: 76.7 kg (169 lb)  Body mass index is 28.12 kg/m².      Intake/Output Summary (Last 24 hours) at 2/27/2023 0602  Last data filed at 2/26/2023 2339  Gross per 24 hour   Intake 1121.71 ml   Output --   Net 1121.71 ml         Significant Labs:  Lab Results   Component Value Date    GROUPTRH A POS 02/26/2023    HEPBSAG Non-reactive 09/22/2022    STREPBCULT No Group B Streptococcus isolated 02/09/2023     Recent Labs   Lab 02/26/23  1615   HGB 10.5*   HCT 31.9*       CBC:   Recent Labs   Lab 02/26/23  1615   WBC 5.56   RBC 3.75*   HGB 10.5*   HCT 31.9*      MCV 85   MCH 28.0   MCHC 32.9     I have personallly reviewed all pertinent lab results from the last 24 hours.    Physical Exam    Review of Systems

## 2023-02-27 NOTE — LACTATION NOTE
02/27/23 0930   Maternal Assessment   Breast Density Bilateral:;soft   Areola Bilateral:;elastic   Nipples Bilateral:;everted   Maternal Infant Feeding   Maternal Emotional State assist needed   Infant Positioning clutch/football   Signs of Milk Transfer audible swallow;infant jaw motion present   Pain with Feeding no   Comfort Measures Before/During Feeding infant position adjusted;latch adjusted;maternal position adjusted   Latch Assistance yes     Assisted to latch baby to left breast in football position. Baby latched deeply, nursing well with audible swallows. Mother denies pain during feeding. Reviewed basic breastfeeding instructions and encouraged to call me for any further breastfeeding assistance. Patient verbalizes understanding of all instructions with good recall.    Instructed on proper latch to facilitate effective breastfeeding.  Discussed recognizing hunger cues, appropriate positioning and wide mouth latch.  Discussed ways to determine an effective latch including:  areola included in latch, rhythmic/nutritive sucking and audible swallowing.  Also discussed soreness/tenderness associated with latch and prevention and treatment.  Pt states understanding and verbalized appropriate recall.

## 2023-02-27 NOTE — PROGRESS NOTES
[facility] - Labor & Delivery  Obstetrics  Postpartum Progress Note    Patient Name: Herminia Melo  MRN: [id number]  Admission Date: 2023  Hospital Length of Stay: 1 days  Attending Physician: Jose Briscoe MD  Primary Care Provider: Primary Doctor No    Subjective:     Principal Problem:Status post normal delivery in completely normal case    Hospital Course:  Oral Cytotec 50 mcg at 1538  Pitocin  Progressed very quickly once she got to  4 cm at 0430 23 with SROM  Complete at 0511 23    Viable female infant at  23  Weight:  Apgar: 9/9  Placenta: spontaneous and intact with three vessels  Small second-degree perineal laceration. Repaired with 1% lidocaine and 2.0 chromic  Bilateral upper labial laceration did not need to be repaired  EBL: 300 cc  No complication  No specimen        Interval History:   Status post vaginal delivery    She is doing well this morning. She is not tolerating a regular diet without nausea or vomiting. She is not voiding spontaneously. She is not ambulating. She has not passed flatus, and has not a BM. Vaginal bleeding is mild. She denies fever or chills. Abdominal pain is mild and controlled with oral medications. She Is breastfeeding. She desires circumcision for her male baby: not applicable.    Objective:     Vital Signs (Most Recent):  Temp: 98.4 °F (36.9 °C) (23)  Pulse: 86 (23 0509)  Resp: 18 (23)  BP: (!) 151/80 (23 0507)  SpO2: 100 % (23 0509)   Vital Signs (24h Range):  Temp:  [98.4 °F (36.9 °C)] 98.4 °F (36.9 °C)  Pulse:  [49-89] 86  Resp:  [18] 18  SpO2:  [95 %-100 %] 100 %  BP: (105-154)/(56-84) 151/80     Weight: 76.7 kg (169 lb)  Body mass index is 28.12 kg/m².      Intake/Output Summary (Last 24 hours) at 2023 0602  Last data filed at 2023 2339  Gross per 24 hour   Intake 1121.71 ml   Output --   Net 1121.71 ml         Significant Labs:  Lab Results   Component Value Date    [facility] POS 2023     HEPBSAG Non-reactive 2022    STREPBCULT No Group B Streptococcus isolated 2023     Recent Labs   Lab 23  1615   HGB 10.5*   HCT 31.9*       CBC:   Recent Labs   Lab 23  1615   WBC 5.56   RBC 3.75*   HGB 10.5*   HCT 31.9*      MCV 85   MCH 28.0   MCHC 32.9     I have personallly reviewed all pertinent lab results from the last 24 hours.    Physical Exam    Review of Systems    Assessment/Plan:     47 y.o. female  for:    * Status post normal delivery in completely normal case  Admit for routine postpartum care        Disposition: As patient meets milestones, will plan to discharge home.    Jose Briscoe MD  Obstetrics  [facility] [place] - Labor & Delivery

## 2023-02-27 NOTE — H&P
[facility] - Labor & Delivery  Obstetrics  History & Physical    Patient Name: Herminia Melo  MRN: [id number]  Admission Date: 2023  Primary Care Provider: Primary Doctor No    Subjective:     Principal Problem:39 weeks gestation of pregnancy    History of Present Illness:  48 yo  at 39w1d  Advanced maternal age at 48 yo  History of multiple early miscarriages with work-up including elevated anticardiolipin antibodies I\gM x 2  Has been getting NST  Admitted today, 23 for labor induction per [facility]'s recommendations  Risks and benefits discussed      Obstetric HPI:  Patient reports None contractions, active fetal movement, No vaginal bleeding , No loss of fluid     This pregnancy has been complicated by   1. Advanced maternal age  2. History of multiple spontaneous abortions with elevated KATHI-IgM x 2.  Has been on supplemental Prometrium    OB History    Para Term  AB Living   6 2 2 0 3 2   SAB IAB Ectopic Multiple Live Births   1 0 0 0 2      # Outcome Date GA Lbr Kulwinder/2nd Weight Sex Delivery Anes PTL Lv   6 Current            5 SAB 2021 7w2d    SAB      4 AB 2020 8w0d    SAB      3 AB 20 10w0d    SAB      2 Term 10/12/10   3.175 kg (7 lb) F Vag-Spont None N LIDA      Name: ines   1 Term 04/15/05   2.722 kg (6 lb) F Vag-Spont EPI N LIDA      Name: Clare     Past Medical History:   Diagnosis Date    Cervical high risk HPV (human papillomavirus) test positive 2020    REPEAT CO-TETING IN 1 YEAR     Past Surgical History:   Procedure Laterality Date    COLONOSCOPY      pt unsure when and why       PTA Medications   Medication Sig    aspirin (ECOTRIN) 81 MG EC tablet Take 81 mg by mouth once daily.    PNV62-FA-om3-dha-epa-fish oil (PRENATAL GUMMY) 400 mcg-35 mg -25 mg-5 mg Chew Take by mouth.    progesterone (PROMETRIUM) 200 MG capsule Take 2 capsules (400 mg total) by mouth nightly.       Review of patient's allergies indicates:  No Known Allergies     Family History        Problem Relation (Age of Onset)    Cancer Paternal Grandmother    Diabetes Father    Hypertension Mother    Sickle cell trait Daughter          Tobacco Use    Smoking status: Never    Smokeless tobacco: Former   Substance and Sexual Activity    Alcohol use: Yes     Comment: not since     Drug use: Never    Sexual activity: Yes     Partners: Male     Birth control/protection: None     Review of Systems   Objective:     Vital Signs (Most Recent):  Temp: 98.4 °F (36.9 °C) (23 1500)  Pulse: 72 (23 1600)  BP: 111/69 (23 1500)  SpO2: 100 % (23 1600) Vital Signs (24h Range):  Temp:  [98.4 °F (36.9 °C)] 98.4 °F (36.9 °C)  Pulse:  [72-84] 72  SpO2:  [100 %] 100 %  BP: (111)/(69) 111/69     Weight: 76.7 kg (169 lb)  Body mass index is 28.12 kg/m².    FHT: 130 Cat 1 (reassuring)  TOCO:  Q 3 minutes    Physical Exam    Cervix:  Dilation:  0  Effacement:  25%  Station: -3  Presentation: Vertex per Nursing staff     Significant Labs:  Lab Results   Component Value Date    GROUPTRH A POS 2023    HEPBSAG Non-reactive 2022    STREPBCULT No Group B Streptococcus isolated 2023       CBC:   Recent Labs   Lab 23  1615   WBC 5.56   RBC 3.75*   HGB 10.5*   HCT 31.9*      MCV 85   MCH 28.0   MCHC 32.9     I have personallly reviewed all pertinent lab results from the last 24 hours.    Assessment/Plan:     47 y.o. female  at 39w1d for:    * 39 weeks gestation of pregnancy  Admit for induction  Cytotec  Pitocin after Cytotec  Pain control   AROM in AM once deemed safe  Expect  later tomorrow, 23    Multigravida of advanced maternal age in third trimester  Admit for induction  Cytotec  Pitocin after Cytotec  Pain control   AROM in AM once deemed safe  Expect  later tomorrow, 23        Jose Briscoe MD  Obstetrics  [facility] [place]

## 2023-02-27 NOTE — NURSING
Pt called out with SROM. Small amt clear fluid noted on pad and perineum. SVE 4/60/-3 with fluid expelled on exam.

## 2023-02-27 NOTE — NURSING
MD Briscoe notified of recent SVE and ctx pattern that does not allow for cytotec admin.  Pt currently receiving IVF bolus and just voided.  Will cont to monitor for ctx pattern to allow cytotec admin and if not will start pitocin.

## 2023-02-27 NOTE — DISCHARGE INSTRUCTIONS
After a Vaginal Birth    General Discharge Instructions    May follow a regular diet, unless otherwise discussed with physician.  Take showers, not baths unless otherwise discussed with physician.  Activity as tolerated.  No lifting or heavy exercise for 6 weeks, no driving for 2 weeks, no sexual intercourse, douching or tampons for 6 weeks  May return to work/school as discussed with physician  Take medications as directed  Discuss birth control with physician  Breast care support bra worn at all times  Lactation consultant referral number ( [phone] or [phone])    Call Your Healthcare Provider Right Away If You Have:  A temperature of 100.4°F or higher.  If your blood pressure is over 155/105.  You have difficulty catching your breath or trouble breathing.  Heavy vaginal bleeding, clots, or vaginal discharge with foul odor. (heavier than menses)  Persistent nausea or vomiting.  You gain more than 3 pounds in 3 days.  Severe headaches not relieved by Tylenol (acetaminophen) or Motrin (ibuprofen)  Blurry or double vision, see spots or flashing lights.  Dizziness or fainting.  New onset swelling or worsening of existing swelling.  Burning or pain when you urinate.  No bowel movement for 5 days.  Redness, warmth, swelling, or pain in the lower leg.  Redness, discharge, or pain worse than you had in the hospital.  Burning, pain, red streaks, or lumpy areas in your breasts.  Cracks, blisters, or blood on your nipples.  Feelings of extreme sadness or anxiety, or a feeling that you dont want to be with your baby.  If you have any new or unusual symptoms or have questions or concerns    Some of these symptoms can occur up to 4 to 6 weeks after delivery. This can be a sign of pre-eclampsia, which is a serious disease that can cause stroke, seizures, organ damage, or death. Do not wait to call your doctor or seek medical attention.    If You Had Stitches  You may have received stitches in the skin near your vagina.  The stitches might have closed an episiotomy (an incision that enlarges the opening of the vagina). Or you may have needed stitches to repair torn skin. Either way, your stitches should dissolve within weeks. Until then, you can help reduce discomfort, aid healing, and reduce your risk of infection by keeping the stitches clean. These tips can help:    Gently wipe from front to back after you urinate or have a bowel movement.  After wiping, spray warm water on the area. Or you can have a sitz bath. This means sitting in a tub with a few inches of water in it. Then pat the area dry or use a hairdryer on a cool setting.  You can take a shower instead of a bath.  Change sanitary pads at least every 2-4 hours.  Place cold or heat packs on the area as directed by your doctors or nurses. Keep a thin towel between the pack and your skin.  Sit on firm seats so the stitches pull less.     Follow-Up  Schedule a  follow-up exam with your healthcare provider for about 6 weeks after delivery. During this exam, your uterus and vaginal area will be checked. Contact your healthcare provider if you think you or your baby are having any problems.                         Breastfeeding Discharge Instructions     AAP recommendation of exclusive breastfeeding for the first 6 months of life and continued breastfeeding with the introduction of supplemental foods beyond the first year of life and recommends to delay all bottle and pacifier use until after 4 weeks of age and breastfeeding is well established.  Discussed the benefits of exclusive breastfeeding for both mother and baby.  Discussed the risks of supplementation/pacifier use on the exclusivity of breastfeeding in the first 6 months. Feed the baby at the earliest sign of hunger or comfort  Hands to mouth, sucking motions  Rooting or searching for something to suck on  Dont wait for crying - it is a not a late sign of hunger; it is a sign of distress    The  feedings may be 8-12 times per 24hrs and will not follow a schedule  Alternate the breast you start the feeding with, or start with the breast that feels the fullest  Switch breasts when the baby takes himself off the breast or falls asleep  Keep offering breasts until the baby looks full, no longer gives hunger signs, and stays asleep when placed on his back in the crib  If the baby is sleepy and wont wake for a feeding, put the baby skin-to-skin dressed in a diaper against the mothers bare chest  Sleep near your baby  The baby should be positioned and latched on to the breast correctly  Chest-to-chest, chin in the breast  Babys lips are flipped outward  Babys mouth is stretched open wide like a shout  Babys sucking should feel like tugging to the mother  The baby should be drinking at the breast:  You should hear swallowing or gulping throughout the feeding  You should see milk on the babys lips when he comes off the breast  Your breasts should be softer when the baby is finished feeding  The baby should look relaxed at the end of feedings  After the 4th day and your milk is in:  The babys poop should turn bright yellow and be loose, watery, and seedy  The baby should have at least 3-4 poops the size of the palm of your hand per day  The baby should have at least 6-8 wet diapers per day  The urine should be light yellow in color  You should drink when you are thirsty and eat a healthy diet when you are    hungry.     Take naps to get the rest you need.   Take medications and/or drink alcohol only with permission of your obstetrician    or the babys pediatrician.  You can also call the [facility],   ([phone]), Monday-Friday, 8am-5pm Central time, to get the most   up-to-date evidence-based information on the use of medications during   pregnancy and breastfeeding.      The baby should be examined by a pediatrician at 3-5 days of age; unless ordered sooner by the pediatrician.  Once your  milk comes in, the baby should be back to birth weight no later than 10-14 days of age.    Primary Engorgement    If the milk is flowing, use wet or dry heat applied to the breasts for approximately 10min prior to each feeding as a comfort measure to facilitate the milk ejection reflex    Follow heat treatment with breast massage to soften hard/lumpy areas of the breast    Use unrestricted, frequent, effective feedings.      Wake baby to feed if necessary    Avoid pacifier and bottle feedings    Hand express or pump breasts to the point of comfort prn    Use cold treatments in the form of ice packs/gel packs/ frozen vegetables wrapped in a soft thin cloth and applied to the breasts for approximately 20min after each feeding until engorgement is resolved    Wear comfortable, supportive bra    Take pain medicine prn    Use anti-inflammatory medications if prescribed by physician    Other:     Pumping Instructions :    Preparation and Hygiene:    Shower daily.  Wear a clean bra each day and wash daily in warm soapy water.  Change wet or moist breast pads frequently.  Moist pads can promote growth of germs.  Actively wash your hands, paying close attention to the area around and under your fingernails, thoroughly with soap and water for 15 seconds before pumping or handling your milk.  Re-wash your hands if you touch anything (scratching your nose, answering the phone, etc) while pumping or handling your milk.   Before pumping your breasts, assemble the pump collection kit and have ready the sterile container and labels.  Place these items on a clean surface next to the breastpump.  Each time after you have finished pumping, take apart all of the parts of the breastpump collection kit and place them in a separate cleaning container (do not place them in the sink).  Be sure to remove the yellow valve from the breastshield and separate the white membrane from the yellow valve.  Rinse all of these parts with cool  water.  Then use a new sponge and/or bottle brush and dishwashing detergent to clean the parts.  Rinse off the soapy water with cool water and air dry on a clean towel covered with a clean cloth.  All parts may also be washed after each use in the top rack of a .  Once each day, sterilize all of the parts of the breastpump collection kit.  This can be done by boiling the kit parts for 10 minutes or by using a Quick Clean Micro-Steam Bag made by [employer]  If condensation appears in the tubing, continue to run the pump with the tubing attached for 1-2 minutes or until the tubing is dry.   Notify your babys nurse or doctor if you become ill or need to take any medication, even over-the-counter medicines.        Collection and Storage of Expressed Breastmilk:         1. Pump your breasts at least 8-10 times every 24 hours.  Double pump (both breasts at  the same time) for at least 15-20 minutes using the most suction that is comfortable.    2. Write the date and time of pumping and the name of any medications you are takingon the babys pre-printed hospital identification label.   3.    Do not touch the inside of the storage containers or lids.      4.        Tightly screw the lid onto the container and place immediately into the                                refrigerator for daily use.  Bottle may remain at room temperature if the next                    feeding is within 4 hours.  5.    Expressed breastmilk should be refrigerated or frozen within 4 hours of                pumping.  6.        Do not store expressed breastmilk on the door of your refrigerator or freeze             where the temperature is warmer.   7.        Refrigerated milk may be stored in for up to 7 days.  At this point it can be              moved to the freezer for 6 -12 months.  8.        Thaw frozen breast milk in overnight in the refrigerator.  Once milk is thawed it              must be used within 24 hours.  9.         Refrigerated breast milk needs to be warmed to room temperature.  Warm by leaving unrefrigerated until it reached room temperature, or place sealed bottle into a cup of warm (not boiling) water or use a bottle warmer.               Never warm breast milk in a microwave or boiling water.    For any questions or concerns call:  Lactation Department at [phone]

## 2023-02-28 VITALS
RESPIRATION RATE: 20 BRPM | HEIGHT: 65 IN | BODY MASS INDEX: 28.16 KG/M2 | HEART RATE: 72 BPM | OXYGEN SATURATION: 99 % | SYSTOLIC BLOOD PRESSURE: 127 MMHG | TEMPERATURE: 98 F | WEIGHT: 169 LBS | DIASTOLIC BLOOD PRESSURE: 80 MMHG

## 2023-02-28 LAB
BASOPHILS # BLD AUTO: 0.08 K/UL (ref 0–0.2)
BASOPHILS NFR BLD: 0.8 % (ref 0–1.9)
DIFFERENTIAL METHOD: ABNORMAL
EOSINOPHIL # BLD AUTO: 0.1 K/UL (ref 0–0.5)
EOSINOPHIL NFR BLD: 1.5 % (ref 0–8)
ERYTHROCYTE [DISTWIDTH] IN BLOOD BY AUTOMATED COUNT: 13 % (ref 11.5–14.5)
HCT VFR BLD AUTO: 30.7 % (ref 37–48.5)
HGB BLD-MCNC: 10.1 G/DL (ref 12–16)
IMM GRANULOCYTES # BLD AUTO: 0.27 K/UL (ref 0–0.04)
IMM GRANULOCYTES NFR BLD AUTO: 2.8 % (ref 0–0.5)
LYMPHOCYTES # BLD AUTO: 1.6 K/UL (ref 1–4.8)
LYMPHOCYTES NFR BLD: 16.3 % (ref 18–48)
MCH RBC QN AUTO: 28.1 PG (ref 27–31)
MCHC RBC AUTO-ENTMCNC: 32.9 G/DL (ref 32–36)
MCV RBC AUTO: 86 FL (ref 82–98)
MONOCYTES # BLD AUTO: 0.9 K/UL (ref 0.3–1)
MONOCYTES NFR BLD: 9.6 % (ref 4–15)
NEUTROPHILS # BLD AUTO: 6.7 K/UL (ref 1.8–7.7)
NEUTROPHILS NFR BLD: 69 % (ref 38–73)
NRBC BLD-RTO: 0 /100 WBC
PLATELET # BLD AUTO: 193 K/UL (ref 150–450)
PMV BLD AUTO: 11.7 FL (ref 9.2–12.9)
RBC # BLD AUTO: 3.59 M/UL (ref 4–5.4)
RPR SER QL: NORMAL
WBC # BLD AUTO: 9.65 K/UL (ref 3.9–12.7)

## 2023-02-28 PROCEDURE — 25000003 PHARM REV CODE 250: Performed by: OBSTETRICS & GYNECOLOGY

## 2023-02-28 PROCEDURE — 85025 COMPLETE CBC W/AUTO DIFF WBC: CPT | Performed by: OBSTETRICS & GYNECOLOGY

## 2023-02-28 PROCEDURE — 36415 COLL VENOUS BLD VENIPUNCTURE: CPT | Performed by: OBSTETRICS & GYNECOLOGY

## 2023-02-28 RX ORDER — IBUPROFEN 600 MG/1
600 TABLET ORAL EVERY 6 HOURS
Qty: 40 TABLET | Refills: 1 | Status: SHIPPED | OUTPATIENT
Start: 2023-02-28

## 2023-02-28 RX ADMIN — IBUPROFEN 600 MG: 600 TABLET ORAL at 05:02

## 2023-02-28 RX ADMIN — IBUPROFEN 600 MG: 600 TABLET ORAL at 11:02

## 2023-02-28 RX ADMIN — PRENATAL VIT W/ FE FUMARATE-FA TAB 27-0.8 MG 1 TABLET: 27-0.8 TAB at 09:02

## 2023-02-28 NOTE — DISCHARGE SUMMARY
[facility]  Obstetrics  Discharge Summary      Patient Name: Herminia Melo  MRN: [id number]  Admission Date: 2023  Hospital Length of Stay: 2 days  Discharge Date and Time: 2023  Attending Physician: Jose Briscoe MD   Discharging Provider: Jose Briscoe MD   Primary Care Provider: Primary Doctor No    HPI: 46 yo  at 39w1d  Advanced maternal age at 46 yo  History of multiple early miscarriages with work-up including elevated anticardiolipin antibodies I\gM x 2  Has been getting NST  Admitted today, 23 for labor induction per [facility]'s recommendations  Risks and benefits discussed          * No surgery found *     Hospital Course:   Oral Cytotec 50 mcg at 1538  Pitocin  Progressed very quickly once she got to  4 cm at 0430 23 with SROM  Complete at 0511 23    Viable female infant at  23  Weight: 6#7.4 2930 gm  Apgar: 9/9  Placenta: spontaneous and intact with three vessels  Small second-degree perineal laceration. Repaired with 1% lidocaine and 2.0 chromic  Bilateral upper labial laceration did not need to be repaired  EBL: 300 cc  No complication  No specimen    23  Postpartum course was benign.  She is breast-feeding well.  Exam was benign with patient afebrile, vitals stable, and minimal bleeding.  Normal activities.  Patient discharged home on postpartum day #1, 23 per request   Discharge medications include Motrin, prenatal vitamins, and iron supplement.  Follow-up with me in 6 weeks.    Jose Briscoe MD.             Final Active Diagnoses:    Diagnosis Date Noted POA    PRINCIPAL PROBLEM:  Status post normal delivery in completely normal case [O80] 2023 Not Applicable      Problems Resolved During this Admission:    Diagnosis Date Noted Date Resolved POA    38 weeks gestation of pregnancy [Z3A.38] 2023 Not Applicable    39 weeks gestation of pregnancy [Z3A.39] 2023 Not Applicable    Multigravida of advanced  maternal age in third trimester [O09.523] 08/01/2022 02/27/2023 Yes    High risk pregnancy due to recurrent pregnancy loss, third trimester [O26.23] 08/01/2022 02/27/2023 Yes        Significant Diagnostic Studies: Labs:   CBC   Recent Labs   Lab 02/26/23  1615   WBC 5.56   HGB 10.5*   HCT 31.9*       and All labs within the past 24 hours have been reviewed      Feeding Method: breast    Immunizations     Date Immunization Status Dose Route/Site Given by    02/27/23 0705 MMR Incomplete 0.5 mL Subcutaneous/     02/27/23 0705 Tdap Incomplete 0.5 mL Intramuscular/           Delivery:    Episiotomy: None   Lacerations: 2nd   Repair suture:     Repair # of packets: 1   Blood loss (ml):       Birth information:  YOB: 2021   Time of birth: 5:16 AM   Sex: female   Delivery type: Vaginal, Spontaneous   Gestational Age: 39w2d    Delivery Clinician:      Other providers:       Additional  information:  Forceps:    Vacuum:    Breech:    Observed anomalies      Living?:           APGARS  One minute Five minutes Ten minutes   Skin color:         Heart rate:         Grimace:         Muscle tone:         Breathing:         Totals: 9  9        Placenta: Delivered:       appearance    Pending Diagnostic Studies:     Procedure Component Value Units Date/Time    CBC auto differential [[id number]] Collected: 02/28/23 0557    Order Status: Sent Lab Status: In process Updated: 02/28/23 0635    Specimen: Blood     RPR [[id number]] Collected: 02/26/23 1615    Order Status: Sent Lab Status: In process Updated: 02/27/23 1236    Specimen: Blood           Discharged Condition: good    Disposition: Home or Self Care    Follow Up:   Follow-up Information     Jose Briscoe MD Follow up in 6 week(s).    Specialties: Obstetrics and Gynecology, Obstetrics and Gynecology  Why: postpartum follow-up  Contact information:  [address]  [address] [place], [place] [address] [phone]                       Patient Instructions:       BREAST PUMP FOR HOME USE     Order Specific Question Answer Comments   Type of pump: Electric    Weight: 76.7 kg (169 lb)    Length of need (1-99 months): 99      No dressing needed     Medications:  Current Discharge Medication List      START taking these medications    Details   ibuprofen (ADVIL,MOTRIN) 600 MG tablet Take 1 tablet (600 mg total) by mouth every 6 (six) hours.  Qty: 40 tablet, Refills: 1         CONTINUE these medications which have NOT CHANGED    Details   [id number]-dha-epa-fish oil (PRENATAL GUMMY) 400 mcg-35 mg -25 mg-5 mg Chew Take by mouth.         STOP taking these medications       aspirin (ECOTRIN) 81 MG EC tablet Comments:   Reason for Stopping:         progesterone (PROMETRIUM) 200 MG capsule Comments:   Reason for Stopping:               Jose Briscoe MD  Obstetrics  [facility]

## 2023-02-28 NOTE — PLAN OF CARE
VSS, breastfeeding on demand, encouraged 8 times in 24 hours. Fundus and lochia WDL. Voiding, passing flatus, ambulating and tolerating regular diet well. Bonding well with baby. Pain being managed with motrin. Stated an understanding to POC.

## 2023-02-28 NOTE — LACTATION NOTE
02/28/23 0750   Maternal Assessment   Breast Density Bilateral:;soft   Areola Bilateral:;elastic   Nipples Bilateral:;everted   Maternal Infant Feeding   Maternal Emotional State independent;relaxed   Infant Positioning clutch/football   Signs of Milk Transfer audible swallow;infant jaw motion present   Pain with Feeding no   Latch Assistance yes     Mother has been breastfeeding independently -assistance to wake baby for feeding -baby with wet and dirty diaper now -baby latches shallow initially and mother complaint of biting but able to get deeper latch for strong sucking with swallows -mother denies discomfort now --review basic breastfeeding information with guide-has Rx for personal breast pump at bedside -encouraged call for any assistance

## 2023-02-28 NOTE — PLAN OF CARE
Problem: Adult Inpatient Plan of Care  Goal: Plan of Care Review  Outcome: Met  Goal: Patient-Specific Goal (Individualized)  Outcome: Met  Goal: Absence of Hospital-Acquired Illness or Injury  Outcome: Met  Goal: Optimal Comfort and Wellbeing  Outcome: Met  Goal: Readiness for Transition of Care  Outcome: Met     Problem:  Fall Injury Risk  Goal: Absence of Fall, Infant Drop and Related Injury  Outcome: Met     Problem: Infection  Goal: Absence of Infection Signs and Symptoms  Outcome: Met     Problem: Bleeding (Labor)  Goal: Hemostasis  Outcome: Met     Problem: Change in Fetal Wellbeing (Labor)  Goal: Stable Fetal Wellbeing  Outcome: Met     Problem: Delayed Labor Progression (Labor)  Goal: Effective Progression to Delivery  Outcome: Met     Problem: Infection (Labor)  Goal: Absence of Infection Signs and Symptoms  Outcome: Met     Problem: Labor Pain (Labor)  Goal: Acceptable Pain Control  Outcome: Met     Problem: Uterine Tachysystole (Labor)  Goal: Normal Uterine Contraction Pattern  Outcome: Met     Problem: Breastfeeding  Goal: Effective Breastfeeding  Outcome: Met     Problem: Adjustment to Role Transition (Postpartum Vaginal Delivery)  Goal: Successful Maternal Role Transition  Outcome: Met     Problem: Bleeding (Postpartum Vaginal Delivery)  Goal: Hemostasis  Outcome: Met     Problem: Infection (Postpartum Vaginal Delivery)  Goal: Absence of Infection Signs/Symptoms  Outcome: Met     Problem: Pain (Postpartum Vaginal Delivery)  Goal: Acceptable Pain Control  Outcome: Met     Problem: Urinary Retention (Postpartum Vaginal Delivery)  Goal: Effective Urinary Elimination  Outcome: Met     Patient discharged per order. VS stable with no signs of distress. Discharge paperwork reviewed with patient. Reviewed urgent maternal warning signs and instructed to go to ER or call physician if symptoms occur. Reviewed signs and symptoms of depression and anxiety and provided relevant handouts. Reviewed  community resources available. Instructed on follow-up appointment with physician. Patient voiced understanding of all.

## 2023-02-28 NOTE — L&D DELIVERY NOTE
[facility]  Vaginal Delivery   Operative Note    SUMMARY     Normal spontaneous vaginal delivery of live infant, was placed on mothers abdomen for skin to skin and bulb suctioning performed.  Infant delivered position DIANA over perineum.  Nuchal cord: No.    Spontaneous delivery of placenta and IV pitocin given noting good uterine tone.  2nd degree laceration noted and repaired in normal fashion with 3.0 chromic .  Patient tolerated delivery well. Sponge needle and lap counted correctly x2.    Indications: Status post normal delivery in completely normal case  Pregnancy complicated by:   Patient Active Problem List   Diagnosis    Status post normal delivery in completely normal case     Admitting GA: 39w2d    Delivery Information for Mali Melo    Birth information:  YOB: 2021   Time of birth: 5:16 AM   Sex: female   Head Delivery Date/Time: 2/27/2023  5:16 AM   Delivery type: Vaginal, Spontaneous   Gestational Age: 39w2d  Unknown    Delivery Providers    Delivering clinician:    Provider Role    Camila Carl, RN     Tonya Pineda, RN     Abena Abreu               Measurements    Weight: 2930 g  Weight (lbs): 6 lb 7.4 oz  Length:          Apgars    Living status: Living  Apgars:  1 min.:  5 min.:  10 min.:  15 min.:  20 min.:    Skin color:  1  1       Heart rate:  2  2       Reflex irritability:  2  2       Muscle tone:  2  2       Respiratory effort:  2  2       Total:  9  9       Apgars assigned by: PREET PINEDA RN         Operative Delivery    Forceps attempted?: No  Vacuum extractor attempted?: No         Shoulder Dystocia    Shoulder dystocia present?: No           Presentation    Presentation: Vertex  Position: Right Occiput Anterior           Interventions/Resuscitation    Method: Bulb Suctioning, Tactile Stimulation       Cord    Vessels: 3 vessels  Complications: None  Delayed Cord Clamping?: Yes  Cord Blood Disposition: Lab  Gases Sent?: No  Stem Cell Collection (by  MD): No       Placenta    Placenta delivery date/time: 2023 0547  Placenta removal: Spontaneous  Placenta appearance: Intact  Placenta disposition: Discarded           Labor Events:       labor: No     Labor Onset Date/Time:         Dilation Complete Date/Time:         Start Pushing Date/Time:         Start Pushing Date/Time:       Rupture Date/Time: 23  0430         Rupture type: SRM (Spontaneous Rupture)           Fluid Amount:         Fluid Color: Clear                 steroids: None     Antibiotics given for GBS: No     Induction: misoprostol     Indications for induction:        Augmentation: oxytocin     Indications for augmentation:       Labor complications: None     Additional complications:          Cervical ripening:                     Delivery:      Episiotomy: None     Indication for Episiotomy:       Perineal Lacerations: 2nd Repaired:  Yes   Periurethral Laceration:   Repaired:     Labial Laceration:   Repaired:     Sulcus Laceration:   Repaired:     Vaginal Laceration:   Repaired:     Cervical Laceration:   Repaired:     Repair suture:       Repair # of packets: 1     Last Value - EBL - Nursing (mL):       Sum - EBL - Nursing (mL): 0     Last Value - EBL - Anesthesia (mL):        Calculated QBL (mL):         Vaginal Sweep Performed: Yes     Surgicount Correct: Yes     Vaginal Packing: No Quantity:       Other providers:       Anesthesia    Method: None          Details (if applicable):  Trial of Labor      Categorization:      Priority:     Indications for :     Incision Type:       Additional  information:  Forceps:    Vacuum:    Breech:    Observed anomalies    Other (Comments):

## 2023-02-28 NOTE — LACTATION NOTE
Review breastfeeding discharge information with mother -aware of need to monitor wet and dirty diapers over the next few days -referred to breastfeeding guide for community resources -all questions answered and states understanding of information

## 2023-03-02 ENCOUNTER — PATIENT MESSAGE (OUTPATIENT)
Dept: OBSTETRICS AND GYNECOLOGY | Facility: HOSPITAL | Age: 48
End: 2023-03-02
Payer: COMMERCIAL

## 2023-03-02 ENCOUNTER — TELEPHONE (OUTPATIENT)
Dept: OBSTETRICS AND GYNECOLOGY | Facility: HOSPITAL | Age: 48
End: 2023-03-02
Payer: COMMERCIAL

## 2023-03-02 NOTE — TELEPHONE ENCOUNTER
Spoke to pt who states baby doing well and breastfeeding often at least every 2 hours or so -baby has not had a stool in last 24 hours but has had 2 wet diapers per mother -just left pediatrician office and baby's weight at 6# -7% weight loss -states pediatrician not concerned and wants to follow-up in 2 weeks -does feel breasts filling today and will monitor output closely over next 24 hours -plan follow-up phone call tomorrow

## 2023-03-03 ENCOUNTER — TELEPHONE (OUTPATIENT)
Dept: OBSTETRICS AND GYNECOLOGY | Facility: HOSPITAL | Age: 48
End: 2023-03-03
Payer: COMMERCIAL

## 2023-03-03 NOTE — TELEPHONE ENCOUNTER
Spoke to pt for follow-up today and baby had 3 stools overnight -stools starting to get lighter in color -breasts are miramontes and softer after baby feeds -question concerning umbilical cord answered and has no other questions or concerns

## 2023-03-06 ENCOUNTER — PATIENT MESSAGE (OUTPATIENT)
Dept: OBSTETRICS AND GYNECOLOGY | Facility: CLINIC | Age: 48
End: 2023-03-06
Payer: COMMERCIAL

## 2023-03-07 ENCOUNTER — PATIENT MESSAGE (OUTPATIENT)
Dept: OBSTETRICS AND GYNECOLOGY | Facility: CLINIC | Age: 48
End: 2023-03-07
Payer: COMMERCIAL

## 2023-03-07 ENCOUNTER — HOSPITAL ENCOUNTER (INPATIENT)
Facility: HOSPITAL | Age: 48
LOS: 1 days | Discharge: HOME | DRG: 776 | End: 2023-03-08
Attending: OBSTETRICS & GYNECOLOGY | Admitting: OBSTETRICS & GYNECOLOGY
Payer: COMMERCIAL

## 2023-03-07 DIAGNOSIS — I10 HTN (HYPERTENSION): ICD-10-CM

## 2023-03-07 LAB — MAGNESIUM SERPL-MCNC: 2.5 MG/DL (ref 1.6–2.6)

## 2023-03-07 PROCEDURE — 36415 COLL VENOUS BLD VENIPUNCTURE: CPT | Performed by: OBSTETRICS & GYNECOLOGY

## 2023-03-07 PROCEDURE — 63600175 PHARM REV CODE 636 W HCPCS: Performed by: OBSTETRICS & GYNECOLOGY

## 2023-03-07 PROCEDURE — 83735 ASSAY OF MAGNESIUM: CPT | Performed by: OBSTETRICS & GYNECOLOGY

## 2023-03-07 PROCEDURE — 11000001 HC ACUTE MED/SURG PRIVATE ROOM

## 2023-03-07 RX ORDER — CALCIUM GLUCONATE 98 MG/ML
1 INJECTION, SOLUTION INTRAVENOUS
Status: DISCONTINUED | OUTPATIENT
Start: 2023-03-07 | End: 2023-03-08 | Stop reason: HOSPADM

## 2023-03-07 RX ORDER — LABETALOL HCL 20 MG/4 ML
20 SYRINGE (ML) INTRAVENOUS ONCE AS NEEDED
Status: DISCONTINUED | OUTPATIENT
Start: 2023-03-07 | End: 2023-03-08 | Stop reason: HOSPADM

## 2023-03-07 RX ORDER — HYDRALAZINE HYDROCHLORIDE 20 MG/ML
10 INJECTION INTRAMUSCULAR; INTRAVENOUS ONCE AS NEEDED
Status: DISCONTINUED | OUTPATIENT
Start: 2023-03-07 | End: 2023-03-08 | Stop reason: HOSPADM

## 2023-03-07 RX ORDER — SODIUM CHLORIDE, SODIUM LACTATE, POTASSIUM CHLORIDE, CALCIUM CHLORIDE 600; 310; 30; 20 MG/100ML; MG/100ML; MG/100ML; MG/100ML
INJECTION, SOLUTION INTRAVENOUS CONTINUOUS
Status: DISCONTINUED | OUTPATIENT
Start: 2023-03-07 | End: 2023-03-08 | Stop reason: HOSPADM

## 2023-03-07 RX ORDER — MAGNESIUM SULFATE HEPTAHYDRATE 40 MG/ML
2 INJECTION, SOLUTION INTRAVENOUS CONTINUOUS
Status: DISCONTINUED | OUTPATIENT
Start: 2023-03-07 | End: 2023-03-08 | Stop reason: HOSPADM

## 2023-03-07 RX ORDER — LABETALOL HCL 20 MG/4 ML
40 SYRINGE (ML) INTRAVENOUS ONCE AS NEEDED
Status: DISCONTINUED | OUTPATIENT
Start: 2023-03-07 | End: 2023-03-08 | Stop reason: HOSPADM

## 2023-03-07 RX ORDER — LABETALOL HCL 20 MG/4 ML
80 SYRINGE (ML) INTRAVENOUS ONCE AS NEEDED
Status: DISCONTINUED | OUTPATIENT
Start: 2023-03-07 | End: 2023-03-08 | Stop reason: HOSPADM

## 2023-03-07 RX ORDER — HYDRALAZINE HYDROCHLORIDE 20 MG/ML
5 INJECTION INTRAMUSCULAR; INTRAVENOUS ONCE AS NEEDED
Status: DISCONTINUED | OUTPATIENT
Start: 2023-03-07 | End: 2023-03-08 | Stop reason: HOSPADM

## 2023-03-07 RX ORDER — MAGNESIUM SULFATE HEPTAHYDRATE 40 MG/ML
4 INJECTION, SOLUTION INTRAVENOUS ONCE
Status: COMPLETED | OUTPATIENT
Start: 2023-03-07 | End: 2023-03-07

## 2023-03-07 RX ADMIN — MAGNESIUM SULFATE IN WATER 2 G/HR: 40 INJECTION, SOLUTION INTRAVENOUS at 10:03

## 2023-03-07 RX ADMIN — MAGNESIUM SULFATE HEPTAHYDRATE 4 G: 40 INJECTION, SOLUTION INTRAVENOUS at 10:03

## 2023-03-08 VITALS
HEIGHT: 65 IN | OXYGEN SATURATION: 97 % | WEIGHT: 158.75 LBS | BODY MASS INDEX: 26.45 KG/M2 | DIASTOLIC BLOOD PRESSURE: 72 MMHG | SYSTOLIC BLOOD PRESSURE: 141 MMHG | TEMPERATURE: 98 F | RESPIRATION RATE: 18 BRPM | HEART RATE: 68 BPM

## 2023-03-08 PROBLEM — I10 HTN (HYPERTENSION): Status: ACTIVE | Noted: 2023-03-08

## 2023-03-08 PROCEDURE — 99238 PR HOSPITAL DISCHARGE DAY,<30 MIN: ICD-10-PCS | Mod: 24,,, | Performed by: OBSTETRICS & GYNECOLOGY

## 2023-03-08 PROCEDURE — 25000003 PHARM REV CODE 250: Performed by: OBSTETRICS & GYNECOLOGY

## 2023-03-08 PROCEDURE — 99238 HOSP IP/OBS DSCHRG MGMT 30/<: CPT | Mod: 24,,, | Performed by: OBSTETRICS & GYNECOLOGY

## 2023-03-08 RX ORDER — ACETAMINOPHEN 325 MG/1
650 TABLET ORAL EVERY 6 HOURS PRN
Status: DISCONTINUED | OUTPATIENT
Start: 2023-03-08 | End: 2023-03-08 | Stop reason: HOSPADM

## 2023-03-08 RX ORDER — BUTALBITAL, ACETAMINOPHEN AND CAFFEINE 50; 325; 40 MG/1; MG/1; MG/1
1 TABLET ORAL ONCE
Status: COMPLETED | OUTPATIENT
Start: 2023-03-08 | End: 2023-03-08

## 2023-03-08 RX ADMIN — BUTALBITAL, ACETAMINOPHEN, AND CAFFEINE 1 TABLET: 325; 50; 40 TABLET ORAL at 09:03

## 2023-03-08 RX ADMIN — ACETAMINOPHEN 650 MG: 325 TABLET ORAL at 05:03

## 2023-03-08 NOTE — NURSING
MD Lozano notified of Mag level 2.5. Orders for Mag 4g load, then 2g/h.  Pt may eat as long as BPs not in severe range.

## 2023-03-08 NOTE — NURSING
Report to MD Lozano of pt's arrival and status.  Notified of Mag infusions given in [facility] ED and that a continuous Mag infusion was not started yet.  Discussed current BPs.  Orders to get stat Mag level and call with results and we will reevaluate IV mag treatment plan.

## 2023-03-08 NOTE — LACTATION NOTE
Mother readmitted with blood pressure concerns- baby at bedside with grandmother and mother breastfeeding independently -denies any concerns at this time -baby breastfeeding well and no problems with breasts

## 2023-03-08 NOTE — NURSING
Pt to unit via EMS transfer from [facility].  Pt arrived with SL IV to L AC.  Pt's mother in room to help with the baby.  Pt c/o pain at IV site - requesting a new IV.  IV restarted to L hand with 20g. LR infusing.

## 2023-03-08 NOTE — HOSPITAL COURSE
48 yo   Status post vaginal delivery on 23  Positive KATHI IgM and beta glycoprotein IgM/IgG  Has been checking her BP at home with her [employer] equipment  Found to have hypertension on 3/6/23.  Told to go to a hospital if blood pressure remained elevated after rest    She went to [facility] yesterday, 3/7/2023  BP up to 170/100  She was then started on magnesium IV and transferred to us.    On Labor and Delivery, Blood pressure 120-170s/60-90.  But has been diuresing well. 2450+ over the past 10 hours  BP for the past 3-4 hours at 120-130/60-80    Exam normal by me.  Facial flush.      Discussed with patient and her mother.  Will stop Magnesium for now  Monitor blood pressure  If <140/90, will discharge home  Follow-up next week with me in clinic    Jose Briscoe MD

## 2023-04-10 ENCOUNTER — POSTPARTUM VISIT (OUTPATIENT)
Dept: OBSTETRICS AND GYNECOLOGY | Facility: CLINIC | Age: 48
End: 2023-04-10
Payer: COMMERCIAL

## 2023-04-10 VITALS
HEIGHT: 65 IN | DIASTOLIC BLOOD PRESSURE: 76 MMHG | WEIGHT: 150.56 LBS | BODY MASS INDEX: 25.08 KG/M2 | SYSTOLIC BLOOD PRESSURE: 120 MMHG

## 2023-04-10 PROCEDURE — 0503F PR POSTPARTUM CARE VISIT: ICD-10-PCS | Mod: CPTII,S$GLB,, | Performed by: OBSTETRICS & GYNECOLOGY

## 2023-04-10 PROCEDURE — 88175 CYTOPATH C/V AUTO FLUID REDO: CPT | Performed by: OBSTETRICS & GYNECOLOGY

## 2023-04-10 PROCEDURE — 99999 PR PBB SHADOW E&M-EST. PATIENT-LVL III: ICD-10-PCS | Mod: PBBFAC,,, | Performed by: OBSTETRICS & GYNECOLOGY

## 2023-04-10 PROCEDURE — 0503F POSTPARTUM CARE VISIT: CPT | Mod: CPTII,S$GLB,, | Performed by: OBSTETRICS & GYNECOLOGY

## 2023-04-10 PROCEDURE — 99999 PR PBB SHADOW E&M-EST. PATIENT-LVL III: CPT | Mod: PBBFAC,,, | Performed by: OBSTETRICS & GYNECOLOGY

## 2023-04-10 PROCEDURE — 87624 HPV HI-RISK TYP POOLED RSLT: CPT | Performed by: OBSTETRICS & GYNECOLOGY

## 2023-04-10 NOTE — PROGRESS NOTES
Subjective:      Patient ID: Herminia Melo is a 47 y.o. female.    Chief Complaint:  Postpartum Follow-up (6 wks PP for  on 2023. Last normal pap was 2020. Pt is breast feeding exclusively)      History of Present Illness  HPI  Ms Melo is a 47 years old, status post vaginal delivery on 23.  She comes in today for an exam and followup.  Patient has no current complaints.  No fever or chills.  No nausea or vomiting.  No diarrhea or constipation.  No abdominal or pelvic pain.    She has resumed normal intercourse.  Patient has begun some walking for exercise. She denies having signs and symptoms of significant depression.  She is breast-feeding well.  Her last Pap smear was performed on 10/20/20.      GYN & OB History  Patient's last menstrual period was 2022 (exact date).   Date of Last Pap: 10/20/2020    OB History    Para Term  AB Living   6 3 3   3 3   SAB IAB Ectopic Multiple Live Births   1     0 3      # Outcome Date GA Lbr Kulwinder/2nd Weight Sex Delivery Anes PTL Lv   6 Term 23 39w2d  2.93 kg (6 lb 7.4 oz) F Vag-Spont None N LIDA   5 SAB 2021 7w2d    SAB      4 AB 2020 8w0d    SAB      3 AB 20 10w0d    SAB      2 Term 10/12/10   3.175 kg (7 lb) F Vag-Spont None N LIDA   1 Term 04/15/05   2.722 kg (6 lb) F Vag-Spont EPI N LIDA     Past Medical History:   Diagnosis Date    Cervical high risk HPV (human papillomavirus) test positive 2020    REPEAT CO-TETING IN 1 YEAR       Past Surgical History:   Procedure Laterality Date    COLONOSCOPY      pt unsure when and why       Family History   Problem Relation Age of Onset    Cancer Paternal Grandmother     Diabetes Father     Hypertension Mother     Sickle cell trait Daughter        Social History     Socioeconomic History    Marital status: Single   Tobacco Use    Smoking status: Never    Smokeless tobacco: Former   Substance and Sexual Activity    Alcohol use: Yes     Comment: not since     Drug use:  Never    Sexual activity: Yes     Partners: Male     Birth control/protection: None   Social History Narrative    Together since 2018    He is a  for the [employer]    She works for [employer].  [employer]       Current Outpatient Medications   Medication Sig Dispense Refill    ibuprofen (ADVIL,MOTRIN) 600 MG tablet Take 1 tablet (600 mg total) by mouth every 6 (six) hours. 40 tablet 1    [id number]-dha-epa-fish oil (PRENATAL GUMMY) 400 mcg-35 mg -25 mg-5 mg Chew Take by mouth.       No current facility-administered medications for this visit.       Review of patient's allergies indicates:  No Known Allergies    Review of Systems  Review of Systems   Constitutional:  Positive for fatigue. Negative for activity change, appetite change, fever and unexpected weight change.   Respiratory:  Negative for cough, shortness of breath and wheezing.    Cardiovascular:  Negative for chest pain and palpitations.   Gastrointestinal:  Positive for abdominal pain and nausea. Negative for vomiting.   Endocrine: Negative for hot flashes.   Genitourinary:  Positive for frequency, pelvic pain and vaginal discharge. Negative for dysmenorrhea, dyspareunia, urgency, vaginal bleeding and postcoital bleeding.   Musculoskeletal:  Positive for back pain. Negative for myalgias.   Integumentary:  Negative for rash, breast mass and nipple discharge.   Neurological:  Positive for headaches. Negative for seizures.   Psychiatric/Behavioral:  Negative for depression and sleep disturbance. The patient is not nervous/anxious.    Breast: Negative for mass, mastodynia and nipple discharge       Objective:     Physical Exam:   Constitutional: She appears well-developed and well-nourished. No distress.   BMI of 25.06    HENT:   Head: Normocephalic and atraumatic.    Eyes: EOM are normal.      Pulmonary/Chest: Effort normal. No respiratory distress.   Breasts: Non-tender, no engorgement, no masses, no retraction, no discharge.  Negative for lymphadenopathy.         Abdominal: Soft. She exhibits no distension. There is no abdominal tenderness. There is no rebound and no guarding.     Genitourinary:    Vagina and uterus normal.   No  no vaginal discharge in the vagina.    Genitourinary Comments: Vulva without any obvious lesions.  Urethral meatus normal size and location without any lesion.  Urethra is non-tender without stricture or discharge.  Bladder is non-tender.  Vaginal vault with good support.  Minimal white discharge noted.  No obvious lesion.  Normal rugation.  Cervix is without any cervical motion tenderness.  No obvious lesion.  Uterus is small, non-tender, normal contour.  Adnexa is without any masses or tenderness.  Perineum without obvious lesion.               Musculoskeletal: Normal range of motion.       Neurological: She is alert.    Skin: Skin is warm and dry.    Psychiatric: She has a normal mood and affect.       Assessment:     1. Postpartum care and examination immediately after delivery             Plan:     I have discussed with the patient regarding her condition.  She has recovered well from her delivery    I have also discussed with the patient regarding her contraceptive options.  Risks and benefits of all discussed including oral contraceptives, Depo-Provera, OrthoEvra, NuvaRing, Mirena/ParaGard, Nexplanon, sterilization. After extensive dicussion, the patient wishes NOT to have any hormonal contraceptive.  Risks and benefits again discussed.  All of her questions were answered appropriately to her satisfaction.  She will use condoms as needed  Pap done  Back next year for her annual visit

## 2023-04-19 LAB
FINAL PATHOLOGIC DIAGNOSIS: NORMAL
Lab: NORMAL